# Patient Record
Sex: MALE | Race: WHITE | NOT HISPANIC OR LATINO | URBAN - METROPOLITAN AREA
[De-identification: names, ages, dates, MRNs, and addresses within clinical notes are randomized per-mention and may not be internally consistent; named-entity substitution may affect disease eponyms.]

---

## 2018-12-18 ENCOUNTER — EMERGENCY (EMERGENCY)
Facility: HOSPITAL | Age: 43
LOS: 1 days | Discharge: HOME | End: 2018-12-18
Attending: EMERGENCY MEDICINE | Admitting: EMERGENCY MEDICINE
Payer: COMMERCIAL

## 2018-12-18 VITALS
OXYGEN SATURATION: 100 % | HEIGHT: 66 IN | RESPIRATION RATE: 17 BRPM | TEMPERATURE: 98 F | HEART RATE: 114 BPM | DIASTOLIC BLOOD PRESSURE: 77 MMHG | SYSTOLIC BLOOD PRESSURE: 122 MMHG | WEIGHT: 190.04 LBS

## 2018-12-18 DIAGNOSIS — Z86.79 PERSONAL HISTORY OF OTHER DISEASES OF THE CIRCULATORY SYSTEM: Chronic | ICD-10-CM

## 2018-12-18 LAB
ALBUMIN SERPL ELPH-MCNC: 4.1 G/DL — SIGNIFICANT CHANGE UP (ref 3.3–5)
ALP SERPL-CCNC: 53 U/L — SIGNIFICANT CHANGE UP (ref 40–120)
ALT FLD-CCNC: 37 U/L — SIGNIFICANT CHANGE UP (ref 12–78)
ANION GAP SERPL CALC-SCNC: 10 MMOL/L — SIGNIFICANT CHANGE UP (ref 5–17)
APTT BLD: 27 SEC — LOW (ref 28.5–37)
AST SERPL-CCNC: 20 U/L — SIGNIFICANT CHANGE UP (ref 15–37)
BILIRUB SERPL-MCNC: 0.6 MG/DL — SIGNIFICANT CHANGE UP (ref 0.2–1.2)
BUN SERPL-MCNC: 23 MG/DL — SIGNIFICANT CHANGE UP (ref 7–23)
CALCIUM SERPL-MCNC: 8.7 MG/DL — SIGNIFICANT CHANGE UP (ref 8.5–10.1)
CHLORIDE SERPL-SCNC: 107 MMOL/L — SIGNIFICANT CHANGE UP (ref 96–108)
CK MB BLD-MCNC: <0.8 % — SIGNIFICANT CHANGE UP (ref 0–3.5)
CK MB CFR SERPL CALC: <1 NG/ML — SIGNIFICANT CHANGE UP (ref 0–3.6)
CK SERPL-CCNC: 121 U/L — SIGNIFICANT CHANGE UP (ref 26–308)
CO2 SERPL-SCNC: 23 MMOL/L — SIGNIFICANT CHANGE UP (ref 22–31)
CREAT SERPL-MCNC: 1.4 MG/DL — HIGH (ref 0.5–1.3)
GLUCOSE SERPL-MCNC: 95 MG/DL — SIGNIFICANT CHANGE UP (ref 70–99)
HCT VFR BLD CALC: 42.7 % — SIGNIFICANT CHANGE UP (ref 39–50)
HGB BLD-MCNC: 14.6 G/DL — SIGNIFICANT CHANGE UP (ref 13–17)
INR BLD: 0.97 RATIO — SIGNIFICANT CHANGE UP (ref 0.88–1.16)
MCHC RBC-ENTMCNC: 29.6 PG — SIGNIFICANT CHANGE UP (ref 27–34)
MCHC RBC-ENTMCNC: 34.2 GM/DL — SIGNIFICANT CHANGE UP (ref 32–36)
MCV RBC AUTO: 86.4 FL — SIGNIFICANT CHANGE UP (ref 80–100)
NRBC # BLD: 0 /100 WBCS — SIGNIFICANT CHANGE UP (ref 0–0)
PLATELET # BLD AUTO: 251 K/UL — SIGNIFICANT CHANGE UP (ref 150–400)
POTASSIUM SERPL-MCNC: 3.7 MMOL/L — SIGNIFICANT CHANGE UP (ref 3.5–5.3)
POTASSIUM SERPL-SCNC: 3.7 MMOL/L — SIGNIFICANT CHANGE UP (ref 3.5–5.3)
PROT SERPL-MCNC: 7.3 G/DL — SIGNIFICANT CHANGE UP (ref 6–8.3)
PROTHROM AB SERPL-ACNC: 11 SEC — SIGNIFICANT CHANGE UP (ref 10–12.9)
RBC # BLD: 4.94 M/UL — SIGNIFICANT CHANGE UP (ref 4.2–5.8)
RBC # FLD: 12.5 % — SIGNIFICANT CHANGE UP (ref 10.3–14.5)
SODIUM SERPL-SCNC: 140 MMOL/L — SIGNIFICANT CHANGE UP (ref 135–145)
TROPONIN I SERPL-MCNC: <.015 NG/ML — SIGNIFICANT CHANGE UP (ref 0.01–0.04)
WBC # BLD: 3.97 K/UL — SIGNIFICANT CHANGE UP (ref 3.8–10.5)
WBC # FLD AUTO: 3.97 K/UL — SIGNIFICANT CHANGE UP (ref 3.8–10.5)

## 2018-12-18 PROCEDURE — 71045 X-RAY EXAM CHEST 1 VIEW: CPT | Mod: 26

## 2018-12-18 PROCEDURE — 99285 EMERGENCY DEPT VISIT HI MDM: CPT

## 2018-12-18 RX ORDER — ACETAMINOPHEN 500 MG
650 TABLET ORAL ONCE
Qty: 0 | Refills: 0 | Status: COMPLETED | OUTPATIENT
Start: 2018-12-18 | End: 2018-12-18

## 2018-12-18 RX ADMIN — Medication 650 MILLIGRAM(S): at 22:18

## 2018-12-18 NOTE — CONSULT NOTE ADULT - SUBJECTIVE AND OBJECTIVE BOX
History of Present Illness:    Past Medical/Surgical History:    Medications:    Family History: Non-contributory family history of premature cardiovascular atherosclerotic disease    Social History: No tobacco, alcohol or drug use    Review of Systems:  General: No fevers, chills, weight loss or gain  Skin: No rashes, color changes  Cardiovascular: No chest pain, orthopnea  Respiratory: No shortness of breath, cough  Gastrointestinal: No nausea, abdominal pain  Genitourinary: No incontinence, pain with urination  Musculoskeletal: No pain, swelling, decreased range of motion  Neurological: No headache, weakness  Psychiatric: No depression, anxiety  Endocrine: No weight loss or gain, increased thirst  All other systems are comprehensively negative.    Physical Exam:  Vitals:        Vital Signs Last 24 Hrs  T(C): 36.6 (18 Dec 2018 18:19), Max: 36.6 (18 Dec 2018 18:19)  T(F): 97.8 (18 Dec 2018 18:19), Max: 97.8 (18 Dec 2018 18:19)  HR: 114 (18 Dec 2018 18:19) (114 - 114)  BP: 122/77 (18 Dec 2018 18:19) (122/77 - 122/77)  BP(mean): --  RR: 17 (18 Dec 2018 18:19) (17 - 17)  SpO2: 100% (18 Dec 2018 18:19) (100% - 100%)  General: NAD  HEENT: MMM  Neck: No JVD, no carotid bruit  Lungs: CTAB  CV: RRR, nl S1/S2, no M/R/G  Abdomen: S/NT/ND, +BS  Extremities: No LE edema, no cyanosis  Neuro: AAOx3, non-focal  Skin: No rash    Labs:                        14.6   3.97  )-----------( 251      ( 18 Dec 2018 19:49 )             42.7                   ECG: NSR, normal axis, LVH with secondary repol abnormality History of Present Illness: The patient is a 43 year old male with a history of HTN, anxiety/depression who presents with chest pain. He was driving when he had sudden onset substernal chest pressure. There was radiation of pain and numbness down both arms. There was associated shortness of breath, dizziness, palpitations, blurriness of his vision, and headache. He had similar symptoms 1 week ago. He went to [facility] where he was ruled out for MI with two sets of cardiac enzymes. He has had similar episodes in the past that were attributed to panic attacks. He had a stress test about 2 years ago after one of these episodes and it was normal.    Past Medical/Surgical History:  HTN, anxiety/depression     Medications:  Home Medications:  hydrALAZINE 25 mg oral tablet:  (18 Dec 2018 18:23)  losartan 100 mg oral tablet: 1 tab(s) orally once a day (18 Dec 2018 18:23)  Zoloft:  (18 Dec 2018 18:23)      Family History: Non-contributory family history of premature cardiovascular atherosclerotic disease    Social History: No tobacco, alcohol or drug use    Review of Systems:  General: No fevers, chills, weight loss or gain  Skin: No rashes, color changes  Cardiovascular: (+) chest pain, orthopnea  Respiratory: (+) shortness of breath, cough  Gastrointestinal: No nausea, abdominal pain  Genitourinary: No incontinence, pain with urination  Musculoskeletal: No pain, swelling, decreased range of motion  Neurological: (+) headache, weakness  Psychiatric: No depression, anxiety  Endocrine: No weight loss or gain, increased thirst  All other systems are comprehensively negative.    Physical Exam:  Vitals:        Vital Signs Last 24 Hrs  T(C): 36.6 (18 Dec 2018 18:19), Max: 36.6 (18 Dec 2018 18:19)  T(F): 97.8 (18 Dec 2018 18:19), Max: 97.8 (18 Dec 2018 18:19)  HR: 114 (18 Dec 2018 18:19) (114 - 114)  BP: 122/77 (18 Dec 2018 18:19) (122/77 - 122/77)  BP(mean): --  RR: 17 (18 Dec 2018 18:19) (17 - 17)  SpO2: 100% (18 Dec 2018 18:19) (100% - 100%)  General: NAD  HEENT: MMM  Neck: No JVD, no carotid bruit  Lungs: CTAB  CV: RRR, nl S1/S2, no M/R/G  Abdomen: S/NT/ND, +BS  Extremities: No LE edema, no cyanosis  Neuro: AAOx3, non-focal  Skin: No rash    Labs:                        14.6   3.97  )-----------( 251      ( 18 Dec 2018 19:49 )             42.7                   ECG: NSR, normal axis, LVH with secondary repol abnormality

## 2018-12-18 NOTE — CONSULT NOTE ADULT - ASSESSMENT
The patient is a 43 year old male with a history of HTN, anxiety/depression who presents with chest pain.    Plan:  - Symptoms likely due to panic attack  - ECG with LVH but no evidence for ischemia or infarction  - Rule out acute MI with two sets of cardiac enzymes  - Check CXR  - If above work-up negative, patient can be discharged and follow-up for stress test and echo

## 2018-12-18 NOTE — ED ADULT NURSE NOTE - OBJECTIVE STATEMENT
Received to the ED a&ox4, with reports of chest pain and elevated anxiety. SOB present. Deep breathing exercises  performed by the patient and comfort measures provided to help decrease anxiety.

## 2018-12-18 NOTE — ED PROVIDER NOTE - OBJECTIVE STATEMENT
pt c/o 1 hr of mid chest pressure with sob and mild blurred vision. no fevers, chills, ha, cough, abd pain, edema, calf pain, travel. pt had similar episode last week, seen at [facility] er, had labs and ct head, was d/c didn't see a cardiologist yet.  pmd - forgot name

## 2018-12-18 NOTE — ED PROVIDER NOTE - PROGRESS NOTE DETAILS
Dewey (cards) seen eval pt, requests 2nd ce at 0100 and d/c if neg for outpt f/u Reevaluated patient at bedside.  Patient feeling much improved, wants to be d/c home to f/u as outpt.  Discussed the results of all diagnostic testing in ED and copies of all reports given.   An opportunity to ask questions was given.  Discussed the importance of prompt, close medical follow-up.  Patient will return with any changes, concerns or persistent / worsening symptoms.  Understanding of all instructions verbalized.

## 2018-12-19 VITALS — DIASTOLIC BLOOD PRESSURE: 55 MMHG | SYSTOLIC BLOOD PRESSURE: 113 MMHG

## 2018-12-19 LAB — TROPONIN I SERPL-MCNC: <.015 NG/ML — SIGNIFICANT CHANGE UP (ref 0.01–0.04)

## 2018-12-19 PROCEDURE — 85027 COMPLETE CBC AUTOMATED: CPT

## 2018-12-19 PROCEDURE — 93005 ELECTROCARDIOGRAM TRACING: CPT

## 2018-12-19 PROCEDURE — 84484 ASSAY OF TROPONIN QUANT: CPT

## 2018-12-19 PROCEDURE — 85610 PROTHROMBIN TIME: CPT

## 2018-12-19 PROCEDURE — 80053 COMPREHEN METABOLIC PANEL: CPT

## 2018-12-19 PROCEDURE — 71045 X-RAY EXAM CHEST 1 VIEW: CPT

## 2018-12-19 PROCEDURE — 82553 CREATINE MB FRACTION: CPT

## 2018-12-19 PROCEDURE — 82550 ASSAY OF CK (CPK): CPT

## 2018-12-19 PROCEDURE — 36415 COLL VENOUS BLD VENIPUNCTURE: CPT

## 2018-12-19 PROCEDURE — 85730 THROMBOPLASTIN TIME PARTIAL: CPT

## 2018-12-19 PROCEDURE — 99284 EMERGENCY DEPT VISIT MOD MDM: CPT | Mod: 25

## 2018-12-19 NOTE — ED ADULT NURSE REASSESSMENT NOTE - NS ED NURSE REASSESS COMMENT FT1
MD Yin made aware of change in v/s. Awaiting further orderers. The patient is in bed a&ox4, denies feeling any changes, currently on cardiac monitor.

## 2019-01-11 ENCOUNTER — EMERGENCY (EMERGENCY)
Facility: HOSPITAL | Age: 44
LOS: 1 days | Discharge: OTHER INSTITUTION | End: 2019-01-11
Attending: EMERGENCY MEDICINE
Payer: MEDICAID

## 2019-01-11 VITALS
SYSTOLIC BLOOD PRESSURE: 147 MMHG | OXYGEN SATURATION: 98 % | WEIGHT: 197.09 LBS | HEIGHT: 66 IN | TEMPERATURE: 98 F | HEART RATE: 112 BPM | DIASTOLIC BLOOD PRESSURE: 96 MMHG | RESPIRATION RATE: 22 BRPM

## 2019-01-11 DIAGNOSIS — F13.10 SEDATIVE, HYPNOTIC OR ANXIOLYTIC ABUSE, UNCOMPLICATED: ICD-10-CM

## 2019-01-11 DIAGNOSIS — F33.2 MAJOR DEPRESSIVE DISORDER, RECURRENT SEVERE WITHOUT PSYCHOTIC FEATURES: ICD-10-CM

## 2019-01-11 DIAGNOSIS — Z86.79 PERSONAL HISTORY OF OTHER DISEASES OF THE CIRCULATORY SYSTEM: Chronic | ICD-10-CM

## 2019-01-11 LAB
ALBUMIN SERPL ELPH-MCNC: 4.5 G/DL — SIGNIFICANT CHANGE UP (ref 3.3–5.2)
ALP SERPL-CCNC: 58 U/L — SIGNIFICANT CHANGE UP (ref 40–120)
ALT FLD-CCNC: 37 U/L — SIGNIFICANT CHANGE UP
ANION GAP SERPL CALC-SCNC: 15 MMOL/L — SIGNIFICANT CHANGE UP (ref 5–17)
APAP SERPL-MCNC: <7.5 UG/ML — LOW (ref 10–26)
AST SERPL-CCNC: 20 U/L — SIGNIFICANT CHANGE UP
BASOPHILS # BLD AUTO: 0 K/UL — SIGNIFICANT CHANGE UP (ref 0–0.2)
BASOPHILS NFR BLD AUTO: 0.2 % — SIGNIFICANT CHANGE UP (ref 0–2)
BILIRUB SERPL-MCNC: 0.6 MG/DL — SIGNIFICANT CHANGE UP (ref 0.4–2)
BUN SERPL-MCNC: 13 MG/DL — SIGNIFICANT CHANGE UP (ref 8–20)
CALCIUM SERPL-MCNC: 9.5 MG/DL — SIGNIFICANT CHANGE UP (ref 8.6–10.2)
CHLORIDE SERPL-SCNC: 102 MMOL/L — SIGNIFICANT CHANGE UP (ref 98–107)
CO2 SERPL-SCNC: 24 MMOL/L — SIGNIFICANT CHANGE UP (ref 22–29)
CREAT SERPL-MCNC: 0.8 MG/DL — SIGNIFICANT CHANGE UP (ref 0.5–1.3)
EOSINOPHIL # BLD AUTO: 0 K/UL — SIGNIFICANT CHANGE UP (ref 0–0.5)
EOSINOPHIL NFR BLD AUTO: 0.4 % — SIGNIFICANT CHANGE UP (ref 0–5)
ETHANOL SERPL-MCNC: <10 MG/DL — SIGNIFICANT CHANGE UP
GLUCOSE SERPL-MCNC: 102 MG/DL — SIGNIFICANT CHANGE UP (ref 70–115)
HCT VFR BLD CALC: 43.4 % — SIGNIFICANT CHANGE UP (ref 42–52)
HGB BLD-MCNC: 15.5 G/DL — SIGNIFICANT CHANGE UP (ref 14–18)
LYMPHOCYTES # BLD AUTO: 1.1 K/UL — SIGNIFICANT CHANGE UP (ref 1–4.8)
LYMPHOCYTES # BLD AUTO: 19.5 % — LOW (ref 20–55)
MCHC RBC-ENTMCNC: 30.6 PG — SIGNIFICANT CHANGE UP (ref 27–31)
MCHC RBC-ENTMCNC: 35.7 G/DL — SIGNIFICANT CHANGE UP (ref 32–36)
MCV RBC AUTO: 85.8 FL — SIGNIFICANT CHANGE UP (ref 80–94)
MONOCYTES # BLD AUTO: 0.5 K/UL — SIGNIFICANT CHANGE UP (ref 0–0.8)
MONOCYTES NFR BLD AUTO: 9.4 % — SIGNIFICANT CHANGE UP (ref 3–10)
NEUTROPHILS # BLD AUTO: 3.8 K/UL — SIGNIFICANT CHANGE UP (ref 1.8–8)
NEUTROPHILS NFR BLD AUTO: 70.3 % — SIGNIFICANT CHANGE UP (ref 37–73)
NT-PROBNP SERPL-SCNC: 23 PG/ML — SIGNIFICANT CHANGE UP (ref 0–300)
PLATELET # BLD AUTO: 215 K/UL — SIGNIFICANT CHANGE UP (ref 150–400)
POTASSIUM SERPL-MCNC: 3.2 MMOL/L — LOW (ref 3.5–5.3)
POTASSIUM SERPL-SCNC: 3.2 MMOL/L — LOW (ref 3.5–5.3)
PROT SERPL-MCNC: 7.4 G/DL — SIGNIFICANT CHANGE UP (ref 6.6–8.7)
RBC # BLD: 5.06 M/UL — SIGNIFICANT CHANGE UP (ref 4.6–6.2)
RBC # FLD: 13 % — SIGNIFICANT CHANGE UP (ref 11–15.6)
SALICYLATES SERPL-MCNC: <0.6 MG/DL — LOW (ref 10–20)
SODIUM SERPL-SCNC: 141 MMOL/L — SIGNIFICANT CHANGE UP (ref 135–145)
WBC # BLD: 5.4 K/UL — SIGNIFICANT CHANGE UP (ref 4.8–10.8)
WBC # FLD AUTO: 5.4 K/UL — SIGNIFICANT CHANGE UP (ref 4.8–10.8)

## 2019-01-11 PROCEDURE — 71045 X-RAY EXAM CHEST 1 VIEW: CPT | Mod: 26

## 2019-01-11 PROCEDURE — 99285 EMERGENCY DEPT VISIT HI MDM: CPT

## 2019-01-11 PROCEDURE — 93010 ELECTROCARDIOGRAM REPORT: CPT

## 2019-01-11 RX ORDER — LOSARTAN POTASSIUM 100 MG/1
100 TABLET, FILM COATED ORAL ONCE
Qty: 0 | Refills: 0 | Status: COMPLETED | OUTPATIENT
Start: 2019-01-11 | End: 2019-01-11

## 2019-01-11 RX ORDER — POTASSIUM CHLORIDE 20 MEQ
40 PACKET (EA) ORAL ONCE
Qty: 0 | Refills: 0 | Status: COMPLETED | OUTPATIENT
Start: 2019-01-11 | End: 2019-01-11

## 2019-01-11 NOTE — ED BEHAVIORAL HEALTH ASSESSMENT NOTE - SUMMARY
43M, , domiciled with mother, works as , hx of depression, hx of suicide attempt via OD, hx of etoh abuse and rehab in 1999, last hopsitalization at [facility] December 2017 for SI s/p breakup with girlfriend, with pmhx HTN presents BIBA from outpatient [facility] psych for medical clearance after he tried to check himself in following OD on pills in context of financial, work and social stressors (from ex-wife regarding money and custody over children).   Requesting inpatient psych and meets criteria

## 2019-01-11 NOTE — ED BEHAVIORAL HEALTH ASSESSMENT NOTE - SUICIDE RISK FACTORS
Substance abuse/dependence/Unable to engage in safety planning/Hopelessness/Mood episode/Access to means (pills, firearms, etc.)

## 2019-01-11 NOTE — ED ADULT TRIAGE NOTE - CHIEF COMPLAINT QUOTE
Patient presents to ER for medical evaluation, patient reports taking 10 xanax pills this AM, reports HX of depression, patient picked up at [facility], he was there in attempt to get admitted, patient states he was trying to put a stop to his pain, reports weakness and dizziness.

## 2019-01-11 NOTE — ED BEHAVIORAL HEALTH ASSESSMENT NOTE - OTHER PAST PSYCHIATRIC HISTORY (INCLUDE DETAILS REGARDING ONSET, COURSE OF ILLNESS, INPATIENT/OUTPATIENT TREATMENT)
hx of depression, hx of suicide attempt via OD, hx of etoh abuse and rehab in 1999, last hopsitalization at [facility] December 2017 for SI s/p breakup with girlfriend

## 2019-01-11 NOTE — ED PROVIDER NOTE - NS ED ROS FT
Const: Denies fever, chills  HEENT: Denies blurry vision, sore throat  Neck: Denies neck pain/stiffness  Resp: + SOB. Denies coughing  Cardiovascular: + CP, + palpitations. Denies LE edema  GI: Denies nausea, vomiting, abdominal pain, diarrhea, constipation, blood in stool  : Denies urinary frequency/urgency/dysuria, hematuria  MSK: Denies back pain  Neuro: Denies HA, dizziness, numbness, weakness  Skin: Denies rashes.   Psych: + SI, Denies HI, AVH

## 2019-01-11 NOTE — ED PROVIDER NOTE - PROGRESS NOTE DETAILS
I discussed with  who will send patient to evaluate the patient, pending medical clearance. recd sign out  patient seen and evaluated, needs admission, awaits bed

## 2019-01-11 NOTE — ED ADULT TRIAGE NOTE - ESI TRIAGE ACUITY LEVEL, MLM
End Of Shift Functional Summary, Nursing      TOILETING:  Does patient need assist with clothing management and/or pericare? No    TOILET TRANSFER:  Pt requires minimal assistance. Pt uses walker. BLADDER:  Pt does not have a plunkett catheter that staff manages. Pt does take medication. Pt is continent. of bladder and voids in toilet  Pt has had 0 bladder accidents during this shift.  (An accident is when the episode is not contained in a brief AND/OR the clothing/linen requires changing/cleaning up.)    BOWEL:  Pt does take medication. Pt is continent of bowel and uses toilet. Pt has had 0 bowel accidents during this shift. (An accident is when the episode is not contained in a brief AND/OR the clothing/linen requires changing/cleaning up.)    BED/CHAIR TRANSFER  Pt requires minimal assistance. Patient requires the assistance of 1 staff member(s). Pt uses walker    EATING  Pt requires no assistance. Pt does not wear dentures. TUBE FEEDINGS:  Pt does not  receive nutrition through tube feedings. Patient requires no assistance with feedings. Documentation reviewed and plan of care discussed/reviewed with   oncoming nurse during the shift. 2

## 2019-01-11 NOTE — ED PROVIDER NOTE - MEDICAL DECISION MAKING DETAILS
Patient presents from outpatient [facility] for medical clearance after an attempt which started yesterday of 10 pills of 10 mg xanax yesterday at 11 pm, then another 3 pills at 11 am today as well as 6 pills of 25 mg prozac 11 pm yesterday and another 3 pills of 25 mg prozac at 11 am today. He is mildly hypertensive, mildly  tachycardic, appears anxious, but awake, alert, and appropriate. Will medically clear and have  evaluate him.

## 2019-01-11 NOTE — ED PROVIDER NOTE - OBJECTIVE STATEMENT
Patient with PMH HTN, depression, anxiety presents BIBA from outpatient [facility] psych for medical clearance after he tried to check himself in following suicide attempt. He states due to financial and social stressors (from ex-wife regarding money and custody over children) he took 10 pills of 10 mg xanax and 6 pills of 25 mg prozac yesterday around 11 pm as he felt increased anxiety which he describes as chest pain, palpitations, SOB and anxiety. This morning around 11 am he took another 3 pills of 10 mg xanax and another 3 pills of 25 mg prozac prior to trying to check himself in to [facility]. His last attempt was around this time last year when he was admitted to [facility] following an attempt of which he cannot recall the details. He denies concomitant drugs/EtOH use. He states that he has been out of his losartan 100 mg medication and therefore his BP has been more elevated than usual. He states he otherwise has been at his health.  PMD: Osvaldo Lugo

## 2019-01-11 NOTE — ED PROVIDER NOTE - PHYSICAL EXAMINATION
Const: Awake, alert and oriented. In no acute distress. Well appearing.  HEENT: NC/AT. Moist mucous membranes.  Eyes: No scleral icterus. EOMI.  Neck:. Soft and supple. Full ROM without pain.  Cardiac: Tachycardic rate and regular rhythm. +S1/S2. No murmurs. Peripheral pulses 2+ and symmetric. No LE edema.  Resp: Speaking in full sentences. No evidence of respiratory distress. No wheezes, rales or rhonchi.  Abd: Soft, non-tender, non-distended. Normal bowel sounds in all 4 quadrants. No guarding or rebound.  Back: Spine midline and non-tender. No CVAT.  Skin: No rashes, abrasions or lacerations.  Neuro: Awake, alert & oriented x 3. Moves all extremities symmetrically.   Psych: Depressed mood, good eye contact, appropriate speech and thought process.

## 2019-01-11 NOTE — ED BEHAVIORAL HEALTH ASSESSMENT NOTE - KNOWN PSYCHIATRIC ADMISSION WITHIN THE PAST 30 DAYS
No

## 2019-01-11 NOTE — ED BEHAVIORAL HEALTH ASSESSMENT NOTE - HPI (INCLUDE ILLNESS QUALITY, SEVERITY, DURATION, TIMING, CONTEXT, MODIFYING FACTORS, ASSOCIATED SIGNS AND SYMPTOMS)
43M, , domiciled with mother, works as , hx of depression, hx of suicide attempt via OD, hx of etoh abuse and rehab in 1999, last hopsitalization at [facility] December 2017 for SI s/p breakup with girlfriend, with pmhx HTN presents BIBA from outpatient [facility] psych for medical clearance after he tried to check himself in following OD on pills.   Pt states he has financial, work and social stressors (from ex-wife regarding money and custody over children). He has been particularly anxious causing significant sleep issues and persistent discomfort. He admits to declining mood over the holidays due to feeling overwhelmed but cannot identify an acute stressor. He took 10 pills of 1mg xanax and 6 pills of 25 mg zoloft yesterday around 11 pm as he felt increased anxiety which he describes as chest pain, palpitations, SOB and anxiety. This morning around 11 am he took another 3 pills of 1mg xanax and another 3 pills of 25 mg zoloft, prior to arriving at [facility]. He denies concomitant drugs/EtOH use - last drink 3 days ago. He endorses anhedonia, hopelessness, withdrawing and isolating over recent weeks. He denies hi/avh; no delusions or paranoia elicited.    Attempted to get collateral from mother Juanis but unavailable - [phone]

## 2019-01-11 NOTE — ED BEHAVIORAL HEALTH NOTE - BEHAVIORAL HEALTH NOTE
SWNote: pt seen by psych NP(Miguelina) found to benefit from inpatient hospitalization. No adult bed available at this moment. Pt not medically clear as yet. SW to follow in the am.

## 2019-01-12 ENCOUNTER — INPATIENT (INPATIENT)
Facility: HOSPITAL | Age: 44
LOS: 36 days | Discharge: HOME | DRG: 885 | End: 2019-02-18
Attending: PSYCHIATRY & NEUROLOGY | Admitting: PSYCHIATRY & NEUROLOGY
Payer: MEDICAID

## 2019-01-12 VITALS
DIASTOLIC BLOOD PRESSURE: 77 MMHG | RESPIRATION RATE: 20 BRPM | SYSTOLIC BLOOD PRESSURE: 125 MMHG | TEMPERATURE: 98 F | OXYGEN SATURATION: 96 % | HEART RATE: 96 BPM

## 2019-01-12 DIAGNOSIS — F33.2 MAJOR DEPRESSIVE DISORDER, RECURRENT SEVERE WITHOUT PSYCHOTIC FEATURES: ICD-10-CM

## 2019-01-12 DIAGNOSIS — Z86.79 PERSONAL HISTORY OF OTHER DISEASES OF THE CIRCULATORY SYSTEM: Chronic | ICD-10-CM

## 2019-01-12 PROBLEM — F41.9 ANXIETY DISORDER, UNSPECIFIED: Chronic | Status: ACTIVE | Noted: 2018-12-18

## 2019-01-12 PROBLEM — I10 ESSENTIAL (PRIMARY) HYPERTENSION: Chronic | Status: ACTIVE | Noted: 2018-12-18

## 2019-01-12 PROBLEM — F32.9 MAJOR DEPRESSIVE DISORDER, SINGLE EPISODE, UNSPECIFIED: Chronic | Status: ACTIVE | Noted: 2018-12-18

## 2019-01-12 LAB
AMPHET UR-MCNC: NEGATIVE — SIGNIFICANT CHANGE UP
APPEARANCE UR: CLEAR — SIGNIFICANT CHANGE UP
BARBITURATES UR SCN-MCNC: NEGATIVE — SIGNIFICANT CHANGE UP
BENZODIAZ UR-MCNC: NEGATIVE — SIGNIFICANT CHANGE UP
BILIRUB UR-MCNC: NEGATIVE — SIGNIFICANT CHANGE UP
COCAINE METAB.OTHER UR-MCNC: NEGATIVE — SIGNIFICANT CHANGE UP
COLOR SPEC: YELLOW — SIGNIFICANT CHANGE UP
DIFF PNL FLD: NEGATIVE — SIGNIFICANT CHANGE UP
EPI CELLS # UR: SIGNIFICANT CHANGE UP
GLUCOSE UR QL: NEGATIVE MG/DL — SIGNIFICANT CHANGE UP
KETONES UR-MCNC: ABNORMAL
LEUKOCYTE ESTERASE UR-ACNC: ABNORMAL
METHADONE UR-MCNC: NEGATIVE — SIGNIFICANT CHANGE UP
NITRITE UR-MCNC: NEGATIVE — SIGNIFICANT CHANGE UP
OPIATES UR-MCNC: NEGATIVE — SIGNIFICANT CHANGE UP
PCP SPEC-MCNC: SIGNIFICANT CHANGE UP
PCP UR-MCNC: NEGATIVE — SIGNIFICANT CHANGE UP
PH UR: 6 — SIGNIFICANT CHANGE UP (ref 5–8)
PROT UR-MCNC: 15 MG/DL
RBC CASTS # UR COMP ASSIST: NEGATIVE /HPF — SIGNIFICANT CHANGE UP (ref 0–4)
SP GR SPEC: 1.02 — SIGNIFICANT CHANGE UP (ref 1.01–1.02)
THC UR QL: NEGATIVE — SIGNIFICANT CHANGE UP
TROPONIN T SERPL-MCNC: <0.01 NG/ML — SIGNIFICANT CHANGE UP (ref 0–0.06)
UROBILINOGEN FLD QL: NEGATIVE MG/DL — SIGNIFICANT CHANGE UP
WBC UR QL: SIGNIFICANT CHANGE UP

## 2019-01-12 PROCEDURE — 93005 ELECTROCARDIOGRAM TRACING: CPT

## 2019-01-12 PROCEDURE — 36415 COLL VENOUS BLD VENIPUNCTURE: CPT

## 2019-01-12 PROCEDURE — 81001 URINALYSIS AUTO W/SCOPE: CPT

## 2019-01-12 PROCEDURE — 99285 EMERGENCY DEPT VISIT HI MDM: CPT | Mod: 25

## 2019-01-12 PROCEDURE — 83880 ASSAY OF NATRIURETIC PEPTIDE: CPT

## 2019-01-12 PROCEDURE — 85027 COMPLETE CBC AUTOMATED: CPT

## 2019-01-12 PROCEDURE — 99213 OFFICE O/P EST LOW 20 MIN: CPT

## 2019-01-12 PROCEDURE — 80053 COMPREHEN METABOLIC PANEL: CPT

## 2019-01-12 PROCEDURE — 84484 ASSAY OF TROPONIN QUANT: CPT

## 2019-01-12 PROCEDURE — 80307 DRUG TEST PRSMV CHEM ANLYZR: CPT

## 2019-01-12 PROCEDURE — 71045 X-RAY EXAM CHEST 1 VIEW: CPT

## 2019-01-12 RX ORDER — ACETAMINOPHEN 500 MG
650 TABLET ORAL EVERY 6 HOURS
Qty: 0 | Refills: 0 | Status: DISCONTINUED | OUTPATIENT
Start: 2019-01-12 | End: 2019-02-18

## 2019-01-12 RX ORDER — ASPIRIN/CALCIUM CARB/MAGNESIUM 324 MG
81 TABLET ORAL DAILY
Qty: 0 | Refills: 0 | Status: DISCONTINUED | OUTPATIENT
Start: 2019-01-12 | End: 2019-01-16

## 2019-01-12 RX ORDER — ASPIRIN/CALCIUM CARB/MAGNESIUM 324 MG
81 TABLET ORAL DAILY
Qty: 0 | Refills: 0 | Status: DISCONTINUED | OUTPATIENT
Start: 2019-01-12 | End: 2019-02-18

## 2019-01-12 RX ORDER — HALOPERIDOL DECANOATE 100 MG/ML
5 INJECTION INTRAMUSCULAR EVERY 6 HOURS
Qty: 0 | Refills: 0 | Status: DISCONTINUED | OUTPATIENT
Start: 2019-01-12 | End: 2019-01-14

## 2019-01-12 RX ORDER — LOSARTAN POTASSIUM 100 MG/1
100 TABLET, FILM COATED ORAL DAILY
Qty: 0 | Refills: 0 | Status: DISCONTINUED | OUTPATIENT
Start: 2019-01-12 | End: 2019-02-11

## 2019-01-12 RX ORDER — ATORVASTATIN CALCIUM 80 MG/1
20 TABLET, FILM COATED ORAL AT BEDTIME
Qty: 0 | Refills: 0 | Status: DISCONTINUED | OUTPATIENT
Start: 2019-01-12 | End: 2019-02-18

## 2019-01-12 RX ORDER — ACETAMINOPHEN 500 MG
650 TABLET ORAL ONCE
Qty: 0 | Refills: 0 | Status: COMPLETED | OUTPATIENT
Start: 2019-01-12 | End: 2019-01-12

## 2019-01-12 RX ORDER — QUETIAPINE FUMARATE 200 MG/1
50 TABLET, FILM COATED ORAL EVERY 6 HOURS
Qty: 0 | Refills: 0 | Status: DISCONTINUED | OUTPATIENT
Start: 2019-01-12 | End: 2019-02-18

## 2019-01-12 RX ORDER — HYDRALAZINE HCL 50 MG
25 TABLET ORAL THREE TIMES A DAY
Qty: 0 | Refills: 0 | Status: DISCONTINUED | OUTPATIENT
Start: 2019-01-12 | End: 2019-01-24

## 2019-01-12 RX ORDER — NICOTINE POLACRILEX 2 MG
1 GUM BUCCAL
Qty: 0 | Refills: 0 | Status: DISCONTINUED | OUTPATIENT
Start: 2019-01-12 | End: 2019-02-18

## 2019-01-12 RX ORDER — HYDRALAZINE HCL 50 MG
25 TABLET ORAL THREE TIMES A DAY
Qty: 0 | Refills: 0 | Status: DISCONTINUED | OUTPATIENT
Start: 2019-01-12 | End: 2019-01-16

## 2019-01-12 RX ORDER — ATORVASTATIN CALCIUM 80 MG/1
20 TABLET, FILM COATED ORAL AT BEDTIME
Qty: 0 | Refills: 0 | Status: DISCONTINUED | OUTPATIENT
Start: 2019-01-12 | End: 2019-01-16

## 2019-01-12 RX ORDER — DIPHENHYDRAMINE HCL 50 MG
25 CAPSULE ORAL EVERY 6 HOURS
Qty: 0 | Refills: 0 | Status: DISCONTINUED | OUTPATIENT
Start: 2019-01-12 | End: 2019-01-14

## 2019-01-12 RX ORDER — TRAZODONE HCL 50 MG
100 TABLET ORAL AT BEDTIME
Qty: 0 | Refills: 0 | Status: DISCONTINUED | OUTPATIENT
Start: 2019-01-12 | End: 2019-01-16

## 2019-01-12 RX ORDER — LOSARTAN POTASSIUM 100 MG/1
100 TABLET, FILM COATED ORAL DAILY
Qty: 0 | Refills: 0 | Status: DISCONTINUED | OUTPATIENT
Start: 2019-01-12 | End: 2019-01-16

## 2019-01-12 RX ADMIN — LOSARTAN POTASSIUM 100 MILLIGRAM(S): 100 TABLET, FILM COATED ORAL at 01:47

## 2019-01-12 RX ADMIN — Medication 650 MILLIGRAM(S): at 05:16

## 2019-01-12 RX ADMIN — Medication 25 MILLIGRAM(S): at 09:21

## 2019-01-12 RX ADMIN — ATORVASTATIN CALCIUM 20 MILLIGRAM(S): 80 TABLET, FILM COATED ORAL at 20:50

## 2019-01-12 RX ADMIN — Medication 650 MILLIGRAM(S): at 08:26

## 2019-01-12 RX ADMIN — Medication 81 MILLIGRAM(S): at 09:21

## 2019-01-12 RX ADMIN — Medication 25 MILLIGRAM(S): at 20:50

## 2019-01-12 RX ADMIN — Medication 40 MILLIEQUIVALENT(S): at 01:14

## 2019-01-12 NOTE — ED BEHAVIORAL HEALTH NOTE - BEHAVIORAL HEALTH NOTE
PROGRESS NOTE: 19 @ 08:42  	  • Reason for Ongoing Consultation: 	held for transfer to in psych bed    ID: [id number] Male with HEALTH ISSUES - PROBLEM Dx:  Benzodiazepine misuse  Severe episode of recurrent major depressive disorder, without psychotic features          INTERVAL DATA:   • Interval Chief Complaint: I feel depressed and suicidal  • Interval History:   slept cooperative with care reviewed medical history and current medications NO complaints agrees to voluntary admisssion Signed legal papers  REVIEW OF SYSTEMS:   • Constitutional Symptoms	No complaints  • Eyes	No complaints  • Ears / Nose / Throat / Mouth	No complaints  • Cardiovascular	No complaints  • Respiratory	No complaints  • Gastrointestinal	No complaints  • Genitourinary	No complaints  • Musculoskeletal	No complaints  • Skin	No complaints  • Neurological	No complaints  • Psychiatric (see HPI)	See HPI  • Endocrine	No complaints  • Hematologic / Lymphatic	No complaints  • Allergic / Immunologic	No complaints    REVIEW OF VITALS/LABS/IMAGING/INVESTIGATIONS:   • Vital signs reviewed: Yes  • Vital Signs:	    T(C): 36.6 (19 @ 07:28), Max: 36.8 (19 @ 03:34)  HR: 78 (19 @ 07:28) (78 - 112)  BP: 117/74 (19 @ 07:28) (117/74 - 147/96)  RR: 20 (19 @ 07:28) (18 - 22)  SpO2: 96% (19 @ 07:28) (94% - 99%)    • Available labs reviewed: Yes  • Available Lab Results:                           15.5   5.4   )-----------( 215      ( 2019 20:48 )             43.4         141  |  102  |  13.0  ----------------------------<  102  3.2<L>   |  24.0  |  0.80    Ca    9.5      2019 20:48    TPro  7.4  /  Alb  4.5  /  TBili  0.6  /  DBili  x   /  AST  20  /  ALT  37  /  AlkPhos  58      LIVER FUNCTIONS - ( 2019 20:48 )  Alb: 4.5 g/dL / Pro: 7.4 g/dL / ALK PHOS: 58 U/L / ALT: 37 U/L / AST: 20 U/L / GGT: x             Urinalysis Basic - ( 2019 01:43 )    Color: Yellow / Appearance: Clear / S.020 / pH: x  Gluc: x / Ketone: Trace  / Bili: Negative / Urobili: Negative mg/dL   Blood: x / Protein: 15 mg/dL / Nitrite: Negative   Leuk Esterase: Trace / RBC: Negative /HPF / WBC 0-2   Sq Epi: x / Non Sq Epi: Occasional / Bacteria: x          MEDICATIONS:      PRN Medications: NONE  • PRN Medications since last evaluation	  • PRN Details	    Current Medications:   aspirin enteric coated 81 milliGRAM(s) Oral daily  atorvastatin 20 milliGRAM(s) Oral at bedtime  hydrALAZINE 25 milliGRAM(s) Oral three times a day  losartan 100 milliGRAM(s) Oral daily     Medication Side Effects:  • Medication Side Effects or Adverse Reactions (new or ongoing)	None known    MENTAL STATUS EXAM:   • Level of Consciousness	Alert  • General Appearance	Well developed  • Body Habitus	Well nourished  • Hygiene	Good  • Grooming	Good  • Behavior	Cooperative  • Eye Contact	Good  • Relatedness	Good  • Impulse Control	Normal  • Muscle Tone / Strength	Normal muscle tone/strength  • Abnormal Movements	No abnormal movements  • Gait / Station	Normal gait / station  • Speech Volume	Normal  • Speech Rate	Normal  • Speech Spontaneity	Normal  • Speech Articulation	Normal  • Mood	depressed  • Affect Quality	sad  • Affect Range	Full  • Affect Congruence	Congruent  • Thought Process	Linear  • Thought Associations	Normal  • Thought Content	suicidal urges to overdose , hopelessness  • Perceptions	No abnormalities  • Oriented to Time	Yes  • Oriented to Place	Yes  • Oriented to Situation	Yes  • Oriented to Person	Yes  • Attention / Concentration	Normal  • Estimated Intelligence	Average  • Recent Memory	Normal  • Remote Memory	Normal  • Fund of Knowledge	Normal  • Language	No abnormalities noted  • Judgment (regarding everyday events)	Fair  • Insight (regarding psychiatric illness)	Fair    SUICIDALITY:   • Suicidality (Interval)	thoughts to take pills    HOMICIDALITY/AGGRESSION:   • Homicidality/Aggression	none known    DIAGNOSIS DSM-V:    Psychiatric Diagnosis (Corresponds to DSM-IV Axis I, II):   HEALTH ISSUES - PROBLEM Dx:  Benzodiazepine misuse  Severe episode of recurrent major depressive disorder, without psychotic features           Medical Diagnosis (Corresponds to DSM-IV Axis III):  • Axis III	Hypertension dyslipidemia      ASSESSMENT OF CURRENT CONDITION:   Summary (include case differential, formulation and patient response to therapy): no change from yesterday Initially presented to [facility] seeking admission Aware alternate facilities maybe considered    Risk Assessment (consider static vs modifiable risk factors and protective factors; comment on level of risk for dangerous behavior): high risk active urges recent ingestion    PLAN  9.13 admission to inpatient psych unit

## 2019-01-12 NOTE — ED ADULT NURSE NOTE - CAS EDN DISCHARGE ASSESSMENT
Drowsy/Alert and oriented to person, place and time Drowsy/cooperative with transfer/Alert and oriented to person, place and time

## 2019-01-12 NOTE — ED ADULT NURSE REASSESSMENT NOTE - COMFORT CARE
darkened lights/ambulated to bathroom/plan of care explained/repositioned/wait time explained/po fluids offered
plan of care explained
plan of care explained/meal provided

## 2019-01-12 NOTE — ED ADULT NURSE REASSESSMENT NOTE - NS ED NURSE REASSESS COMMENT FT1
Patient provided breakfast but declined to ate.  No attempts to harm self ro others and safety maintained.  Patient complied with medications as prescribed.

## 2019-01-12 NOTE — ED ADULT NURSE REASSESSMENT NOTE - NS ED NURSE REASSESS COMMENT FT1
Report given to accepting  RNCoy, PIV d/c'd per protocol, pt safely transferred to unit with assist of SNA and security.

## 2019-01-12 NOTE — ED ADULT NURSE NOTE - CHIEF COMPLAINT QUOTE
Patient presents to ER for medical evaluation, patient reports taking 10 xanax pills this AM, reports HX of depression, patient picked up at [facility], he was there in attempt to get admitted, patient states he was trying to put a stop to his pain, reports weakness and dizziness.

## 2019-01-12 NOTE — ED BEHAVIORAL HEALTH NOTE - BEHAVIORAL HEALTH NOTE
SW Note: SW [place]. Hat[place] [facility] are able to accept pt. Pt made aware and in agreement. Pt given the phone to let family know. Nurse made aware to give handoff. The accepting  is Dr. Hatfield and pt to go to [address] [place] [address]. Ambulance arranged. No need for sw to follow.

## 2019-01-12 NOTE — ED ADULT NURSE REASSESSMENT NOTE - NS ED NURSE REASSESS COMMENT FT1
Received pt AoX3, calm and in yellow gowns. Pt cooperative with security check. pt went to [facility] for medical clearance after he tried to check himself in following suicide attempt. Pt reports he took 10 0.5mg xanax, 6 0.25mg Prozac last night and then this morning at 11am he took 10 more xanax and 3  more Prozac as a SA. Pt reports he "wanted the pain to go away". He states due to financial and social stressors (from ex-wife regarding money and custody over children). Pt has a hx of depression,  hx of SAs by OD, and psychiatric hospitalizations. pt reports his last attempt was around this time last year when he was admitted to [facility] following an attempt. pt reports he was recently at [facility] for panic attacks, Pt admits to using cocaine Monday. Pt also admits to having AH/VH for the past two days. pt states when he sees brown he hears voices saying "It doesn't matter" and when he sees white he can hear his children voices. Pt states he feels safe in  and contracts for safety. Pt c/o HA. MD Davidson made aware. Tylenol 650mg PO ordered. No further pt complaints a this time. Received pt AoX3, calm and in yellow gowns. Pt cooperative with security check. pt went to [facility] for medical clearance after he tried to check himself in following suicide attempt. Pt reports he took 10 0.5mg xanax, 6 0.25mg Prozac last night and then this morning at 11am he took 10 more xanax and 3  more Prozac as a SA. Pt reports he "wanted the pain to go away". He states stressors are due to financial and social stressors (from ex-wife regarding money and custody over children). Pt has a hx of depression,  hx of SAs by OD, and psychiatric hospitalizations. pt reports his last attempt was around this time last year when he was admitted to [facility]. pt reports he was recently at [facility] for panic attacks, Pt admits to using cocaine Monday. Pt also admits to having AH/VH for the past two days. pt states when he sees brown he hears voices saying "It doesn't matter" and when he sees white he can hear his children voices. pt states he has been having difficulties sleeping at night and only gets 2hr/night.. Pt c/o DELGADO. MD Davidson made aware. Tylenol 650mg PO ordered. No further pt complaints a this time. Pt contracts for safety. Will continue to monitor the pt and maintain safety.

## 2019-01-12 NOTE — ED ADULT NURSE REASSESSMENT NOTE - NS ED NURSE REASSESS COMMENT FT1
Assumed care of patient at 0720.  Patient resting in bed awake.  Oriented to staff and educated about plan of care.  No pain or distress noted.  No attempts to harm self or others and safety maintained.

## 2019-01-12 NOTE — ED ADULT NURSE REASSESSMENT NOTE - GENERAL PATIENT STATE
resting/sleeping/cooperative/comfortable appearance
comfortable appearance/resting/sleeping/cooperative
resting/sleeping/comfortable appearance

## 2019-01-12 NOTE — ED BEHAVIORAL HEALTH NOTE - BEHAVIORAL HEALTH NOTE
JERO Note: Jero spoke to Miley from [facility], Ms. Mason BARTH from [facility], Emma from [facility], Lashawn from [place] and no beds available. JERO spoke to Lori from [facility] and there is a male bed available. Clinicals sent for review by Dr. Hatfield. SW to follow.

## 2019-01-13 DIAGNOSIS — I10 ESSENTIAL (PRIMARY) HYPERTENSION: ICD-10-CM

## 2019-01-13 DIAGNOSIS — F33.2 MAJOR DEPRESSIVE DISORDER, RECURRENT SEVERE WITHOUT PSYCHOTIC FEATURES: ICD-10-CM

## 2019-01-13 DIAGNOSIS — F17.200 NICOTINE DEPENDENCE, UNSPECIFIED, UNCOMPLICATED: ICD-10-CM

## 2019-01-13 DIAGNOSIS — E78.5 HYPERLIPIDEMIA, UNSPECIFIED: ICD-10-CM

## 2019-01-13 DIAGNOSIS — Z29.9 ENCOUNTER FOR PROPHYLACTIC MEASURES, UNSPECIFIED: ICD-10-CM

## 2019-01-13 LAB
ALBUMIN SERPL ELPH-MCNC: 3.7 G/DL — SIGNIFICANT CHANGE UP (ref 3.3–5)
ALP SERPL-CCNC: 49 U/L — SIGNIFICANT CHANGE UP (ref 30–120)
ALT FLD-CCNC: 41 U/L DA — SIGNIFICANT CHANGE UP (ref 10–60)
ANION GAP SERPL CALC-SCNC: 10 MMOL/L — SIGNIFICANT CHANGE UP (ref 5–17)
AST SERPL-CCNC: 12 U/L — SIGNIFICANT CHANGE UP (ref 10–40)
BILIRUB DIRECT SERPL-MCNC: 0.2 MG/DL — SIGNIFICANT CHANGE UP (ref 0–0.2)
BILIRUB INDIRECT FLD-MCNC: 0.5 MG/DL — SIGNIFICANT CHANGE UP (ref 0.2–1)
BILIRUB SERPL-MCNC: 0.7 MG/DL — SIGNIFICANT CHANGE UP (ref 0.2–1.2)
BUN SERPL-MCNC: 22 MG/DL — SIGNIFICANT CHANGE UP (ref 7–23)
CALCIUM SERPL-MCNC: 9 MG/DL — SIGNIFICANT CHANGE UP (ref 8.4–10.5)
CHLORIDE SERPL-SCNC: 105 MMOL/L — SIGNIFICANT CHANGE UP (ref 96–108)
CHOLEST SERPL-MCNC: 175 MG/DL — SIGNIFICANT CHANGE UP (ref 10–199)
CO2 SERPL-SCNC: 26 MMOL/L — SIGNIFICANT CHANGE UP (ref 22–31)
CREAT SERPL-MCNC: 1.09 MG/DL — SIGNIFICANT CHANGE UP (ref 0.5–1.3)
GGT SERPL-CCNC: 31 U/L — SIGNIFICANT CHANGE UP (ref 9–50)
GLUCOSE SERPL-MCNC: 100 MG/DL — HIGH (ref 70–99)
HBA1C BLD-MCNC: 5.6 % — SIGNIFICANT CHANGE UP (ref 4–5.6)
HDLC SERPL-MCNC: 28 MG/DL — LOW
LIPID PNL WITH DIRECT LDL SERPL: 99 MG/DL — SIGNIFICANT CHANGE UP
MAGNESIUM SERPL-MCNC: 2.3 MG/DL — SIGNIFICANT CHANGE UP (ref 1.6–2.6)
PHOSPHATE SERPL-MCNC: 3.6 MG/DL — SIGNIFICANT CHANGE UP (ref 2.5–4.5)
POTASSIUM SERPL-MCNC: 3.6 MMOL/L — SIGNIFICANT CHANGE UP (ref 3.5–5.3)
POTASSIUM SERPL-SCNC: 3.6 MMOL/L — SIGNIFICANT CHANGE UP (ref 3.5–5.3)
PROT SERPL-MCNC: 6.9 G/DL — SIGNIFICANT CHANGE UP (ref 6–8.3)
SODIUM SERPL-SCNC: 141 MMOL/L — SIGNIFICANT CHANGE UP (ref 135–145)
T PALLIDUM AB TITR SER: NEGATIVE — SIGNIFICANT CHANGE UP
TOTAL CHOLESTEROL/HDL RATIO MEASUREMENT: 6.3 RATIO — SIGNIFICANT CHANGE UP (ref 3.4–9.6)
TRIGL SERPL-MCNC: 238 MG/DL — HIGH (ref 10–149)

## 2019-01-13 PROCEDURE — 90792 PSYCH DIAG EVAL W/MED SRVCS: CPT

## 2019-01-13 RX ORDER — OMEGA-3 ACID ETHYL ESTERS 1 G
2 CAPSULE ORAL
Qty: 0 | Refills: 0 | Status: DISCONTINUED | OUTPATIENT
Start: 2019-01-13 | End: 2019-02-18

## 2019-01-13 RX ORDER — FOLIC ACID 0.8 MG
1 TABLET ORAL DAILY
Qty: 0 | Refills: 0 | Status: DISCONTINUED | OUTPATIENT
Start: 2019-01-13 | End: 2019-02-18

## 2019-01-13 RX ADMIN — ATORVASTATIN CALCIUM 20 MILLIGRAM(S): 80 TABLET, FILM COATED ORAL at 20:23

## 2019-01-13 RX ADMIN — Medication 2 GRAM(S): at 20:23

## 2019-01-13 RX ADMIN — Medication 650 MILLIGRAM(S): at 12:51

## 2019-01-13 RX ADMIN — LOSARTAN POTASSIUM 100 MILLIGRAM(S): 100 TABLET, FILM COATED ORAL at 08:28

## 2019-01-13 RX ADMIN — Medication 650 MILLIGRAM(S): at 13:30

## 2019-01-13 RX ADMIN — Medication 25 MILLIGRAM(S): at 20:23

## 2019-01-13 RX ADMIN — Medication 25 MILLIGRAM(S): at 08:28

## 2019-01-13 RX ADMIN — Medication 25 MILLIGRAM(S): at 13:04

## 2019-01-13 RX ADMIN — Medication 100 MILLIGRAM(S): at 20:23

## 2019-01-13 RX ADMIN — Medication 81 MILLIGRAM(S): at 08:28

## 2019-01-13 NOTE — H&P ADULT - NSHPLABSRESULTS_GEN_ALL_CORE
15.5   5.4   )-----------( 215      ( 11 Jan 2019 20:48 )             43.4     13 Jan 2019 07:24    141    |  105    |  22     ----------------------------<  100    3.6     |  26     |  1.09     Ca    9.0        13 Jan 2019 07:24  Phos  3.6       13 Jan 2019 07:24  Mg     2.3       13 Jan 2019 07:24    TPro  6.9    /  Alb  3.7    /  TBili  0.7    /  DBili  0.2    /  AST  12     /  ALT  41     /  AlkPhos  49     13 Jan 2019 07:24    01-13 [id number] 5.6    01-13 Chol 175 LDL 99 HDL 28<L> Trig 238<H>

## 2019-01-13 NOTE — BEHAVIORAL HEALTH ASSESSMENT NOTE - SUMMARY
43M, , domiciled with mother, works as , hx of depression, hx of suicide attempt via OD, hx of etoh abuse and rehab in 1999, last hopsitalization at [facility] December 2017 for SI s/p breakup with girlfriend, with pmhx HTN presents BIBA from outpatient [facility] psych for medical clearance after he tried to check himself in following OD on pills in context of financial, work and social stressors (from ex-wife regarding money and custody over children). Patient was admitted on a voluntary 9.13

## 2019-01-13 NOTE — H&P ADULT - NSHPREVIEWOFSYSTEMS_GEN_ALL_CORE
REVIEW OF SYSTEMS:  CONSTITUTIONAL: No fever, weight loss, or fatigue  EYES: No eye pain, visual disturbances, or discharge  ENMT:  No difficulty hearing, tinnitus, vertigo; No sinus or throat pain  NECK: No pain or stiffness  BREASTS: No pain, masses, or nipple discharge  RESPIRATORY: No cough, wheezing, chills or hemoptysis; No shortness of breath  CARDIOVASCULAR: No chest pain, palpitations, dizziness, or leg swelling  GASTROINTESTINAL: No abdominal or epigastric pain. No nausea, vomiting, or hematemesis; No diarrhea or constipation. No melena or hematochezia.  GENITOURINARY: No dysuria, frequency, hematuria, or incontinence  NEUROLOGICAL: No headaches, memory loss, loss of strength, numbness, or tremors  SKIN: No itching, burning, rashes, or lesions   LYMPH NODES: No enlarged glands  ENDOCRINE: No heat or cold intolerance; No hair loss; No polydipsia or polyuria  MUSCULOSKELETAL: No joint pain or swelling; No muscle, back, or extremity pain  PSYCHIATRIC: + depression,  HEME/LYMPH: No easy bruising, or bleeding gums  ALLERGY AND IMMUNOLOGIC: No hives or eczema REVIEW OF SYSTEMS:  CONSTITUTIONAL: No fever, weight loss, or fatigue  EYES: No eye pain, visual disturbances, or discharge  ENMT:  No difficulty hearing, tinnitus, vertigo; No sinus or throat pain  NECK: No pain or stiffness  BREASTS: No pain, masses, or nipple discharge  RESPIRATORY: No cough, wheezing, chills or hemoptysis; No shortness of breath  CARDIOVASCULAR: No chest pain, palpitations, dizziness, or leg swelling  GASTROINTESTINAL: No abdominal or epigastric pain. No nausea, vomiting, or hematemesis; No diarrhea or constipation. No melena or hematochezia.  GENITOURINARY: No dysuria, frequency, hematuria, or incontinence  NEUROLOGICAL: No headaches, memory loss, loss of strength, numbness, or tremors  SKIN: No itching, burning, rashes, or lesions   LYMPH NODES: No enlarged glands  ENDOCRINE: No heat or cold intolerance; No hair loss; No polydipsia or polyuria  MUSCULOSKELETAL: No joint pain or swelling; No muscle, back, or extremity pain  HEME/LYMPH: No easy bruising, or bleeding gums  ALLERGY AND IMMUNOLOGIC: No hives or eczema

## 2019-01-13 NOTE — H&P ADULT - NSHPSOCIALHISTORY_GEN_ALL_CORE
Social History:    Marital Status:   Occupation: Works as .   Lives with:  Mother    Substance Use : Denies  Tobacco Usage:  (   ) never smoked   (   ) former smoker   (X) current smoker  (     ) pack year  (        ) last tobacco use date  Alcohol Usage: Denies

## 2019-01-13 NOTE — BEHAVIORAL HEALTH ASSESSMENT NOTE - RISK ASSESSMENT
currently at high risk to self given highly impulsive, suicide attempt with overdose on medications, stressors, male gender, mood episode

## 2019-01-13 NOTE — BEHAVIORAL HEALTH ASSESSMENT NOTE - HPI (INCLUDE ILLNESS QUALITY, SEVERITY, DURATION, TIMING, CONTEXT, MODIFYING FACTORS, ASSOCIATED SIGNS AND SYMPTOMS)
43M, , domiciled with mother, works as , hx of depression, hx of suicide attempt via OD, hx of etoh abuse and rehab in 1999, last hopsitalization at [facility] December 2017 for SI s/p breakup with girlfriend, with pmhx HTN presented to the [facility] Emergency Department BIB EMS from outpatient [facility] psych for medical clearance after he tried to check himself in following OD on pills. Pt states he has financial, work and social stressors (from ex-wife regarding money and custody over children). He has been particularly anxious causing significant sleep issues and persistent discomfort. He admits to declining mood over the holidays due to feeling overwhelmed but cannot identify an acute stressor. He took 10 pills of 1mg xanax and 6 pills of 25 mg zoloft yesterday around 11 pm as he felt increased anxiety which he describes as chest pain, palpitations, SOB and anxiety. (UTOX "-" in ED). This morning around 11 am he took another 3 pills of 1mg xanax and another 3 pills of 25 mg zoloft, prior to arriving at [facility]. He denies concomitant drugs/EtOH use - last drink 3 days ago. He endorses anhedonia, hopelessness, withdrawing and isolating over recent weeks. He denies hallucinations/psychosis; no delusions or paranoia elicited.    Attempted to get collateral from mother Juanis but unavailable - [phone] 43M, , domiciled with mother, works as , hx of depression, hx of suicide attempt via OD, hx of etoh abuse and rehab in 1999, last hopsitalization at [facility] December 2017 for SI s/p breakup with girlfriend, with pmhx HTN presented to the [facility] Emergency Department BIB EMS from outpatient [facility] psych for medical clearance after he tried to check himself in following OD on pills. Pt states he has financial, work and social stressors (from ex-wife regarding money and custody over children). He has been particularly anxious causing significant sleep issues and persistent discomfort. He admits to declining mood over the holidays due to feeling overwhelmed but cannot identify an acute stressor. He took 10 pills of 1mg xanax and 6 pills of 25 mg zoloft yesterday around 11 pm as he felt increased anxiety which he describes as chest pain, palpitations, SOB and anxiety. (UTOX "-" in ED). This morning around 11 am he took another 3 pills of 1mg xanax and another 3 pills of 25 mg zoloft, prior to arriving at [facility]. He denies concomitant drugs/EtOH use - last drink 3 days ago. He endorses anhedonia, hopelessness, withdrawing and isolating over recent weeks. He denies hallucinations/psychosis; no delusions or paranoia elicited.    Attempted to get collateral from mother Juanis but unavailable - [phone]    On Unit 1N, Patient has thus far been calm, cooperative, engages and keeps to himself in his room. Still feels generally tired today and is napping in his bed. Ate breakfast. No complaints.

## 2019-01-13 NOTE — H&P ADULT - PROBLEM SELECTOR PLAN 2
Continue patient on Lipitor.  Lipid panel noted.   Triglyceride still high despite Lipitor.  Will add Lovaza.

## 2019-01-13 NOTE — BEHAVIORAL HEALTH ASSESSMENT NOTE - NSBHREFEROUTSIDE_PSY_A_CORE_FT
transferred from [facility] Emergency Room where he was BIBA by EMS from outpatient [facility] for medical clearance after Patient tried to check himself in following OD on pills in a suicide attempt. Patient was seen by [facility] NP

## 2019-01-13 NOTE — H&P ADULT - ASSESSMENT
43 years old male with past medical history of Hypertension, Hyperlipidemia, Tobacco use disorder, Depression, who is admitted to in patient psychiatry unit for stabilization of his medications and mood. Patient is seen for medical history and physical.

## 2019-01-13 NOTE — H&P ADULT - NSHPPHYSICALEXAM_GEN_ALL_CORE
Vital Signs Last 24 Hrs  T(C): --  T(F): 98.4  HR: 86/min  BP: 110/70  BP(mean): --  RR: 18/min  SpO2: 98%    PHYSICAL EXAM:  GENERAL: NAD, well-groomed, well-developed  HEAD:  Atraumatic, Normocephalic  EYES: EOMI, PERRLA, conjunctiva and sclera clear  ENMT: No tonsillar erythema, exudates, or enlargement; Moist mucous membranes, Good dentition, No lesions  NECK: Supple, No JVD, Normal thyroid  CHEST/LUNG: Clear to auscultation bilaterally; No rales, rhonchi, wheezing, or rubs  HEART: Regular rate and rhythm; No murmurs, rubs, or gallops  ABDOMEN: Soft, Nontender, Nondistended; Bowel sounds present  EXTREMITIES:  2+ Peripheral Pulses, No clubbing, cyanosis, or edema  MS: No joint swelling or deformity.   LYMPH: No lymphadenopathy noted  SKIN: No rashes or lesions  NERVOUS SYSTEM:  Motor Strength 4/5 B/L upper and lower extremities; DTRs 2+ intact and symmetric  PSYCH:  Alert & Oriented X3.

## 2019-01-13 NOTE — BEHAVIORAL HEALTH ASSESSMENT NOTE - OTHER PAST PSYCHIATRIC HISTORY (INCLUDE DETAILS REGARDING ONSET, COURSE OF ILLNESS, INPATIENT/OUTPATIENT TREATMENT)
hx of depression, hx of suicide attempt via OD, hx of etoh abuse and rehab in 1999, last hopsitalization at [facility] December 2017 for SI s/p breakup with girlfriend

## 2019-01-13 NOTE — BEHAVIORAL HEALTH ASSESSMENT NOTE - NSBHADMITMEDEDUDETAILS_A_CORE FT
hold starting standing psychiatric medications at this time; allow patient to clear all substances he ingested / overdosed on. PRNs only. Continue HTN medications

## 2019-01-13 NOTE — BEHAVIORAL HEALTH ASSESSMENT NOTE - NSBHADMITIPSTRENGTH_PSY_A_CORE
Attempting to realize his/her potential/Cooperative with treatment/Has supportive interpersonal relationships with family, friends or peers/Motivated and ready for change/Has access to housing/residential stability/In good physical health

## 2019-01-14 DIAGNOSIS — F10.10 ALCOHOL ABUSE, UNCOMPLICATED: ICD-10-CM

## 2019-01-14 PROCEDURE — 99233 SBSQ HOSP IP/OBS HIGH 50: CPT

## 2019-01-14 RX ORDER — SERTRALINE 25 MG/1
50 TABLET, FILM COATED ORAL DAILY
Qty: 0 | Refills: 0 | Status: DISCONTINUED | OUTPATIENT
Start: 2019-01-15 | End: 2019-01-16

## 2019-01-14 RX ORDER — HALOPERIDOL DECANOATE 100 MG/ML
5 INJECTION INTRAMUSCULAR EVERY 6 HOURS
Qty: 0 | Refills: 0 | Status: DISCONTINUED | OUTPATIENT
Start: 2019-01-14 | End: 2019-02-18

## 2019-01-14 RX ORDER — DIPHENHYDRAMINE HCL 50 MG
25 CAPSULE ORAL EVERY 6 HOURS
Qty: 0 | Refills: 0 | Status: DISCONTINUED | OUTPATIENT
Start: 2019-01-14 | End: 2019-02-08

## 2019-01-14 RX ADMIN — Medication 2 GRAM(S): at 08:43

## 2019-01-14 RX ADMIN — Medication 650 MILLIGRAM(S): at 09:11

## 2019-01-14 RX ADMIN — Medication 25 MILLIGRAM(S): at 13:24

## 2019-01-14 RX ADMIN — Medication 650 MILLIGRAM(S): at 10:00

## 2019-01-14 RX ADMIN — Medication 25 MILLIGRAM(S): at 21:12

## 2019-01-14 RX ADMIN — Medication 650 MILLIGRAM(S): at 19:00

## 2019-01-14 RX ADMIN — Medication 2 GRAM(S): at 21:13

## 2019-01-14 RX ADMIN — Medication 81 MILLIGRAM(S): at 08:43

## 2019-01-14 RX ADMIN — Medication 650 MILLIGRAM(S): at 18:04

## 2019-01-14 RX ADMIN — ATORVASTATIN CALCIUM 20 MILLIGRAM(S): 80 TABLET, FILM COATED ORAL at 21:12

## 2019-01-14 RX ADMIN — Medication 100 MILLIGRAM(S): at 21:14

## 2019-01-14 RX ADMIN — LOSARTAN POTASSIUM 100 MILLIGRAM(S): 100 TABLET, FILM COATED ORAL at 08:44

## 2019-01-14 RX ADMIN — Medication 25 MILLIGRAM(S): at 08:44

## 2019-01-14 RX ADMIN — Medication 1 TABLET(S): at 08:44

## 2019-01-14 RX ADMIN — Medication 1 MILLIGRAM(S): at 08:44

## 2019-01-14 NOTE — PROGRESS NOTE BEHAVIORAL HEALTH - RISK ASSESSMENT
currently at high risk to self given highly impulsive, suicide attempt with overdose on medications, stressors, male gender, mood episode, ETOH abuse

## 2019-01-14 NOTE — PROGRESS NOTE BEHAVIORAL HEALTH - NSBHADMITMEDEDUDETAILS_A_CORE FT
Psychoeducation provided re: resuming Zoloft at 50mg tomorrow, Benadryl prn for anxiety,  and potential benefits and SE of Zoloft and Benadryl with teach back verbalized.  Patient declines Care Notes, as he reports he has been on Zoloft for a while and is familiar with it, and felt it helped him, and did not have any SE other than occasional mild sedation at Zoloft 75mg.

## 2019-01-14 NOTE — PROGRESS NOTE BEHAVIORAL HEALTH - NSBHFUPDXUPDATEFT_PSY_A_CORE
Anthony reports hx of ETOH abuse, including inpatient rehab, and reports 13 years sobriety in AA.  He reports he relapsed, was drinking 1-2x per week

## 2019-01-14 NOTE — PROGRESS NOTE BEHAVIORAL HEALTH - NSBHFUPINTERVALHXFT_PSY_A_CORE
Met with and evaluated patient.  Chart reviewed and case discussed in tx team meeting. No significant interval events are reported.  Patient reports hx of ETOH abuse, with 13 years sobriety in AA, but left AA, and reports began binge drinking due to stress at job and due to relationship problems.  He reports he was drinking 1-2 times per week, and reports he wants to maintain sobriety, and would resume AA once he is back home. He also reports he reduced his Zoloft to 25mg (from 75mg) at home due to feeling "too sedated sometimes".  He reports after overdosing he had an episode of AH (has not had AH before or since) in which he heard his children's voices, and then went and told his mother to take him to [facility], was then transferred to [facility] for medical care, and now transferred to [facility] for psychiatric care and safety.  He reports he is depressed and anxious, and wants to resume Zoloft, which he reports was helpful in the past.  Resume Zoloft at 50mg qam.  Benadryl 25mg ordered q6h prn for anxiety. He denies current  HI.  He reports some passive SI, but denies a plan, and reports he will tell staff if he feels he will harm self or others.  He denies current AH or VH.  No Rx SE are noted or reported

## 2019-01-15 LAB — T PALLIDUM AB TITR SER: NEGATIVE — SIGNIFICANT CHANGE UP

## 2019-01-15 PROCEDURE — 99233 SBSQ HOSP IP/OBS HIGH 50: CPT

## 2019-01-15 RX ORDER — FLUTICASONE PROPIONATE 50 MCG
1 SPRAY, SUSPENSION NASAL
Qty: 0 | Refills: 0 | Status: COMPLETED | OUTPATIENT
Start: 2019-01-15 | End: 2019-01-22

## 2019-01-15 RX ADMIN — Medication 2 GRAM(S): at 08:41

## 2019-01-15 RX ADMIN — Medication 2 GRAM(S): at 21:16

## 2019-01-15 RX ADMIN — SERTRALINE 50 MILLIGRAM(S): 25 TABLET, FILM COATED ORAL at 08:41

## 2019-01-15 RX ADMIN — Medication 81 MILLIGRAM(S): at 08:41

## 2019-01-15 RX ADMIN — Medication 650 MILLIGRAM(S): at 13:03

## 2019-01-15 RX ADMIN — Medication 650 MILLIGRAM(S): at 22:13

## 2019-01-15 RX ADMIN — Medication 25 MILLIGRAM(S): at 21:16

## 2019-01-15 RX ADMIN — Medication 1 SPRAY(S): at 21:19

## 2019-01-15 RX ADMIN — Medication 25 MILLIGRAM(S): at 13:02

## 2019-01-15 RX ADMIN — Medication 100 MILLIGRAM(S): at 21:16

## 2019-01-15 RX ADMIN — Medication 1 MILLIGRAM(S): at 08:42

## 2019-01-15 RX ADMIN — Medication 25 MILLIGRAM(S): at 08:42

## 2019-01-15 RX ADMIN — ATORVASTATIN CALCIUM 20 MILLIGRAM(S): 80 TABLET, FILM COATED ORAL at 21:16

## 2019-01-15 RX ADMIN — Medication 650 MILLIGRAM(S): at 14:13

## 2019-01-15 RX ADMIN — Medication 1 TABLET(S): at 08:43

## 2019-01-15 RX ADMIN — Medication 650 MILLIGRAM(S): at 21:18

## 2019-01-15 RX ADMIN — LOSARTAN POTASSIUM 100 MILLIGRAM(S): 100 TABLET, FILM COATED ORAL at 08:42

## 2019-01-15 NOTE — PROGRESS NOTE BEHAVIORAL HEALTH - NSBHFUPINTERVALHXFT_PSY_A_CORE
Met with and evaluated patient.  Chart reviewed and case discussed in tx team meeting. No significant interval events are reported.  Patient reports he went to  on the unit last night and it was very helpful to him.  He reports he will call his former sponsor in , as the sponsor was very helpful to him .He denies any SI today and CAMS assessment was done which shows low risk for suicide, but patient reports psychological pain and stress due to his life circumstances.  He reports he wants to live, and is able to ID reasons to live:  "my children, my family, my girlfriend"  He denies access to weapons, and reports he is hopeful verbal therapy can help him. He reports he has begun reading again in the hospital and enjoys that. He is able to ID coping skills that help him such as listening to music and spending time with his children.  He reports if he has SI at home he would tell his mother, and/or call the  Crisis Center, the Suicide Hotline or go to the [facility] in [place].  He was given phone numbers for the [facility] (and is aware he can call there if he needs to talk to someone), the Suicide Hotline and the phone number and address for the [facility] in [place]. He reports that his suicide attempts have occurred when he was drinking, and he plans to maintain sobriety, and verbalizes understanding of the risks of ETOH use/abuse.     He denies current AH or VH.  No Rx SE are noted or reported Met with and evaluated patient.  Chart reviewed and case discussed in tx team meeting. No significant interval events are reported.  Patient reports he went to  on the unit last night and it was very helpful to him.  He reports he will call his former sponsor in , as the sponsor was very helpful to him .He denies any SI today and CAMS assessment was done which shows low risk for suicide, but patient reports psychological pain and stress due to his life circumstances.  He reports he wants to live, and is able to ID reasons to live:  "my children, my family, my girlfriend"  He denies access to weapons, and reports he is hopeful verbal therapy can help him. He reports he has begun reading again in the hospital and enjoys that. He is able to ID coping skills that help him such as listening to music and spending time with his children.  He reports if he has SI at home he would tell his mother, and/or call the  Crisis Center, the Suicide Hotline or go to the [facility] in [place].  He was given phone numbers for the  Crisis Center (and is aware he can call there if he needs to talk to someone), the Suicide Hotline and the phone number and address for the [facility] in [place]. He reports that his suicide attempts have occurred when he was drinking, and he plans to maintain sobriety, and verbalizes understanding of the risks of ETOH use/abuse.     He denies current AH or VH.  No Rx SE are noted or reported    Addendum: 01/15/2019: Patient was seen today PM, chart reviewed, and discussed in team today. He endorsed that he is 42 y/o , and domiciled with mother/step-father. He added that he was an  and worked in [employer] industry for 25 + years. He endorsed that his primary issues is drinking, he was sober for 13 years, before relapsing again. He feels that if he is with his GF he can control himself by drinking 1-2 glasses, but if he is with others, things unravel fast and he ends up losing control of his drinking. He added that he was in rehab/detox in the past but now continues to drink. he supports himself by dipping in his 401(K), he is depressed due to divorce, not seeing his children, and financial losses. He added that he tried to commit suicide, 3 times mostly OD on pills. He is willing  to refrain from Alcohol, but doesn't know how to get the required help. He denied S/H/I/P at this time.    Plan: To continue meds as ordered          LFT in AM with S. Ammonia          If LFT/Ammonia is stable to start Naltrexone PO 50 mg/day

## 2019-01-15 NOTE — PROGRESS NOTE BEHAVIORAL HEALTH - NSBHADMITMEDEDUDETAILS_A_CORE FT
Psychoeducation provided re: need for Rx/aftercare/ sobriety adherence for sx management and relapse prevention with teach back verbalized

## 2019-01-15 NOTE — PROGRESS NOTE BEHAVIORAL HEALTH - RISK ASSESSMENT
Denies current SI or HI.  Risk factors:  mood episode, some hopelessness, substance abuse, panic sx, , perceived burden at times,  Protective factors: responsibility to others, IDs reasons to live, future oriented, supportive social network/family, reports motivated for sobriety and aftercare.

## 2019-01-16 LAB
ALBUMIN SERPL ELPH-MCNC: 3.9 G/DL — SIGNIFICANT CHANGE UP (ref 3.3–5)
ALP SERPL-CCNC: 51 U/L — SIGNIFICANT CHANGE UP (ref 30–120)
ALT FLD-CCNC: 39 U/L DA — SIGNIFICANT CHANGE UP (ref 10–60)
AMMONIA BLD-MCNC: 14 UMOL/L — SIGNIFICANT CHANGE UP (ref 11–32)
AST SERPL-CCNC: 15 U/L — SIGNIFICANT CHANGE UP (ref 10–40)
BILIRUB DIRECT SERPL-MCNC: 0.2 MG/DL — SIGNIFICANT CHANGE UP (ref 0–0.2)
BILIRUB INDIRECT FLD-MCNC: 0.4 MG/DL — SIGNIFICANT CHANGE UP (ref 0.2–1)
BILIRUB SERPL-MCNC: 0.6 MG/DL — SIGNIFICANT CHANGE UP (ref 0.2–1.2)
PROT SERPL-MCNC: 7.3 G/DL — SIGNIFICANT CHANGE UP (ref 6–8.3)
TSH SERPL-MCNC: 3.95 UIU/ML — SIGNIFICANT CHANGE UP (ref 0.27–4.2)

## 2019-01-16 PROCEDURE — 99232 SBSQ HOSP IP/OBS MODERATE 35: CPT

## 2019-01-16 RX ORDER — TRAZODONE HCL 50 MG
150 TABLET ORAL AT BEDTIME
Qty: 0 | Refills: 0 | Status: DISCONTINUED | OUTPATIENT
Start: 2019-01-16 | End: 2019-01-17

## 2019-01-16 RX ORDER — SERTRALINE 25 MG/1
75 TABLET, FILM COATED ORAL DAILY
Qty: 0 | Refills: 0 | Status: DISCONTINUED | OUTPATIENT
Start: 2019-01-17 | End: 2019-01-29

## 2019-01-16 RX ORDER — NALTREXONE HYDROCHLORIDE 50 MG/1
50 TABLET, FILM COATED ORAL DAILY
Qty: 0 | Refills: 0 | Status: DISCONTINUED | OUTPATIENT
Start: 2019-01-17 | End: 2019-01-18

## 2019-01-16 RX ORDER — IBUPROFEN 200 MG
400 TABLET ORAL EVERY 6 HOURS
Qty: 0 | Refills: 0 | Status: DISCONTINUED | OUTPATIENT
Start: 2019-01-16 | End: 2019-02-18

## 2019-01-16 RX ADMIN — Medication 25 MILLIGRAM(S): at 21:17

## 2019-01-16 RX ADMIN — ATORVASTATIN CALCIUM 20 MILLIGRAM(S): 80 TABLET, FILM COATED ORAL at 21:17

## 2019-01-16 RX ADMIN — Medication 81 MILLIGRAM(S): at 08:32

## 2019-01-16 RX ADMIN — Medication 2 GRAM(S): at 08:32

## 2019-01-16 RX ADMIN — Medication 25 MILLIGRAM(S): at 08:33

## 2019-01-16 RX ADMIN — Medication 1 SPRAY(S): at 08:32

## 2019-01-16 RX ADMIN — Medication 1 TABLET(S): at 08:32

## 2019-01-16 RX ADMIN — Medication 1 MILLIGRAM(S): at 08:32

## 2019-01-16 RX ADMIN — Medication 25 MILLIGRAM(S): at 12:24

## 2019-01-16 RX ADMIN — SERTRALINE 50 MILLIGRAM(S): 25 TABLET, FILM COATED ORAL at 08:32

## 2019-01-16 RX ADMIN — LOSARTAN POTASSIUM 100 MILLIGRAM(S): 100 TABLET, FILM COATED ORAL at 08:33

## 2019-01-16 RX ADMIN — Medication 150 MILLIGRAM(S): at 21:17

## 2019-01-16 RX ADMIN — Medication 1 SPRAY(S): at 21:16

## 2019-01-16 RX ADMIN — Medication 2 GRAM(S): at 21:16

## 2019-01-16 NOTE — PROGRESS NOTE BEHAVIORAL HEALTH - NSBHFUPINTERVALHXFT_PSY_A_CORE
Met with and evaluated patient.  Chart reviewed and case discussed in tx team meeting. No significant interval events are reported.  Patient reports he did not sleep well last night. .He denies any SI today, but reports that he still feels depressed.  He reports his depression level as "a 3" (1=very depressed and 10=happy). He continues to discuss his motivation for sobriety, and reports he is willing to try po Naltrexone to help maintain his sobriety.   He denies any HI, AH or VH.  No Rx SE are noted or reported.  Zoloft increased to 75mg, Trazadone, increased to 100mg, and to begin Naltrexone 50mg qam

## 2019-01-16 NOTE — PROGRESS NOTE BEHAVIORAL HEALTH - NSBHADMITMEDEDUDETAILS_A_CORE FT
Psychoeducation provided re: potential benefits/SE of Naltrexone and potential for priapism with Trazadone with teach back verbalized and Care Notes given re: Naltrexone

## 2019-01-17 PROCEDURE — 99232 SBSQ HOSP IP/OBS MODERATE 35: CPT

## 2019-01-17 RX ORDER — TRAZODONE HCL 50 MG
150 TABLET ORAL AT BEDTIME
Qty: 0 | Refills: 0 | Status: DISCONTINUED | OUTPATIENT
Start: 2019-01-17 | End: 2019-01-18

## 2019-01-17 RX ADMIN — Medication 25 MILLIGRAM(S): at 08:30

## 2019-01-17 RX ADMIN — ATORVASTATIN CALCIUM 20 MILLIGRAM(S): 80 TABLET, FILM COATED ORAL at 21:06

## 2019-01-17 RX ADMIN — Medication 1 MILLIGRAM(S): at 08:30

## 2019-01-17 RX ADMIN — Medication 1 TABLET(S): at 08:29

## 2019-01-17 RX ADMIN — Medication 2 GRAM(S): at 08:29

## 2019-01-17 RX ADMIN — Medication 25 MILLIGRAM(S): at 12:58

## 2019-01-17 RX ADMIN — Medication 25 MILLIGRAM(S): at 21:05

## 2019-01-17 RX ADMIN — Medication 81 MILLIGRAM(S): at 08:29

## 2019-01-17 RX ADMIN — NALTREXONE HYDROCHLORIDE 50 MILLIGRAM(S): 50 TABLET, FILM COATED ORAL at 08:29

## 2019-01-17 RX ADMIN — Medication 1 SPRAY(S): at 21:05

## 2019-01-17 RX ADMIN — SERTRALINE 75 MILLIGRAM(S): 25 TABLET, FILM COATED ORAL at 08:30

## 2019-01-17 RX ADMIN — Medication 150 MILLIGRAM(S): at 21:04

## 2019-01-17 RX ADMIN — Medication 2 GRAM(S): at 21:03

## 2019-01-17 RX ADMIN — Medication 1 SPRAY(S): at 08:29

## 2019-01-17 RX ADMIN — LOSARTAN POTASSIUM 100 MILLIGRAM(S): 100 TABLET, FILM COATED ORAL at 08:29

## 2019-01-17 NOTE — PROGRESS NOTE BEHAVIORAL HEALTH - NSBHFUPINTERVALHXFT_PSY_A_CORE
Met with and evaluated patient.  Chart reviewed and case discussed in tx team meeting. No significant interval events are reported.  Patient reports he slept well on increased dose of Trazadone.  .He denies any SI today, and reports that he  feels less depressed.  He reports his depression level as "a 5" (1=very depressed and 10=happy). He continues to discuss his motivation for sobriety, and reports he is willing to try po Naltrexone to help maintain his sobriety.  Tolerating Naltrexone and increase in Zoloft and Trazadone well.  Patient reports he wants to go to an inpatient rehab, and SW is aware.   He denies any HI, AH or VH.  No Rx SE are noted or reported.

## 2019-01-18 PROCEDURE — 99232 SBSQ HOSP IP/OBS MODERATE 35: CPT

## 2019-01-18 RX ORDER — TRAZODONE HCL 50 MG
100 TABLET ORAL AT BEDTIME
Qty: 0 | Refills: 0 | Status: DISCONTINUED | OUTPATIENT
Start: 2019-01-18 | End: 2019-02-18

## 2019-01-18 RX ORDER — NALTREXONE HYDROCHLORIDE 50 MG/1
25 TABLET, FILM COATED ORAL DAILY
Qty: 0 | Refills: 0 | Status: DISCONTINUED | OUTPATIENT
Start: 2019-01-18 | End: 2019-01-20

## 2019-01-18 RX ADMIN — Medication 1 SPRAY(S): at 08:30

## 2019-01-18 RX ADMIN — Medication 400 MILLIGRAM(S): at 22:06

## 2019-01-18 RX ADMIN — ATORVASTATIN CALCIUM 20 MILLIGRAM(S): 80 TABLET, FILM COATED ORAL at 21:41

## 2019-01-18 RX ADMIN — Medication 1 SPRAY(S): at 21:41

## 2019-01-18 RX ADMIN — Medication 2 GRAM(S): at 21:41

## 2019-01-18 RX ADMIN — NALTREXONE HYDROCHLORIDE 25 MILLIGRAM(S): 50 TABLET, FILM COATED ORAL at 21:45

## 2019-01-18 RX ADMIN — NALTREXONE HYDROCHLORIDE 50 MILLIGRAM(S): 50 TABLET, FILM COATED ORAL at 08:31

## 2019-01-18 RX ADMIN — Medication 25 MILLIGRAM(S): at 08:31

## 2019-01-18 RX ADMIN — Medication 1 TABLET(S): at 08:31

## 2019-01-18 RX ADMIN — SERTRALINE 75 MILLIGRAM(S): 25 TABLET, FILM COATED ORAL at 08:31

## 2019-01-18 RX ADMIN — Medication 2 GRAM(S): at 08:31

## 2019-01-18 RX ADMIN — Medication 81 MILLIGRAM(S): at 08:30

## 2019-01-18 RX ADMIN — LOSARTAN POTASSIUM 100 MILLIGRAM(S): 100 TABLET, FILM COATED ORAL at 08:31

## 2019-01-18 RX ADMIN — Medication 100 MILLIGRAM(S): at 21:41

## 2019-01-18 RX ADMIN — Medication 25 MILLIGRAM(S): at 13:21

## 2019-01-18 RX ADMIN — Medication 1 MILLIGRAM(S): at 08:30

## 2019-01-18 NOTE — PROGRESS NOTE BEHAVIORAL HEALTH - NSBHFUPINTERVALHXFT_PSY_A_CORE
Met with and evaluated patient.  Chart reviewed and case discussed in tx team meeting. No significant interval events are reported, except c/o reported above.  Patient reports he slept well on increased dose of Trazadone, but discussed with patient that it may be causing the blurred vision and dizziness. .He denies any SI today, and reports that he continues to feel less depressed.  He reports his depression level as "a 6" (1=very depressed and 10=happy). He continues to discuss his motivation for sobriety, and reports he is willing to go to inpatient rehab.   He denies any HI, AH or VH.  No Rx SE are noted or reported, except the blurred vision and the slight dizziness.  Trazadone decreased to 100mg hs, Naltrexone decreased to 25mg qam.

## 2019-01-18 NOTE — PROGRESS NOTE BEHAVIORAL HEALTH - NSBHADMITMEDEDUDETAILS_A_CORE FT
Psychoeducation provided re: need to tell staff if he has any physical c/o and that doses of Naltrexone and Trazadone are being decreased with teach back verbalized

## 2019-01-19 RX ADMIN — Medication 81 MILLIGRAM(S): at 08:49

## 2019-01-19 RX ADMIN — ATORVASTATIN CALCIUM 20 MILLIGRAM(S): 80 TABLET, FILM COATED ORAL at 20:02

## 2019-01-19 RX ADMIN — Medication 400 MILLIGRAM(S): at 19:54

## 2019-01-19 RX ADMIN — Medication 25 MILLIGRAM(S): at 08:53

## 2019-01-19 RX ADMIN — Medication 1 MILLIGRAM(S): at 08:52

## 2019-01-19 RX ADMIN — Medication 100 MILLIGRAM(S): at 19:54

## 2019-01-19 RX ADMIN — SERTRALINE 75 MILLIGRAM(S): 25 TABLET, FILM COATED ORAL at 08:49

## 2019-01-19 RX ADMIN — NALTREXONE HYDROCHLORIDE 25 MILLIGRAM(S): 50 TABLET, FILM COATED ORAL at 08:50

## 2019-01-19 RX ADMIN — Medication 2 GRAM(S): at 08:52

## 2019-01-19 RX ADMIN — Medication 1 SPRAY(S): at 08:53

## 2019-01-19 RX ADMIN — Medication 1 SPRAY(S): at 20:02

## 2019-01-19 RX ADMIN — Medication 2 GRAM(S): at 20:03

## 2019-01-19 RX ADMIN — Medication 400 MILLIGRAM(S): at 20:29

## 2019-01-19 RX ADMIN — LOSARTAN POTASSIUM 100 MILLIGRAM(S): 100 TABLET, FILM COATED ORAL at 08:52

## 2019-01-19 RX ADMIN — Medication 1 TABLET(S): at 08:55

## 2019-01-20 PROCEDURE — 99233 SBSQ HOSP IP/OBS HIGH 50: CPT

## 2019-01-20 RX ADMIN — ATORVASTATIN CALCIUM 20 MILLIGRAM(S): 80 TABLET, FILM COATED ORAL at 20:44

## 2019-01-20 RX ADMIN — Medication 81 MILLIGRAM(S): at 08:59

## 2019-01-20 RX ADMIN — Medication 400 MILLIGRAM(S): at 13:04

## 2019-01-20 RX ADMIN — NALTREXONE HYDROCHLORIDE 25 MILLIGRAM(S): 50 TABLET, FILM COATED ORAL at 08:59

## 2019-01-20 RX ADMIN — SERTRALINE 75 MILLIGRAM(S): 25 TABLET, FILM COATED ORAL at 09:02

## 2019-01-20 RX ADMIN — Medication 1 MILLIGRAM(S): at 09:02

## 2019-01-20 RX ADMIN — Medication 1 SPRAY(S): at 20:44

## 2019-01-20 RX ADMIN — LOSARTAN POTASSIUM 100 MILLIGRAM(S): 100 TABLET, FILM COATED ORAL at 09:02

## 2019-01-20 RX ADMIN — Medication 100 MILLIGRAM(S): at 20:44

## 2019-01-20 RX ADMIN — Medication 2 GRAM(S): at 08:59

## 2019-01-20 RX ADMIN — Medication 25 MILLIGRAM(S): at 20:44

## 2019-01-20 RX ADMIN — Medication 25 MILLIGRAM(S): at 13:05

## 2019-01-20 RX ADMIN — Medication 2 GRAM(S): at 20:44

## 2019-01-20 RX ADMIN — Medication 1 TABLET(S): at 09:02

## 2019-01-20 RX ADMIN — Medication 25 MILLIGRAM(S): at 09:02

## 2019-01-20 RX ADMIN — Medication 1 SPRAY(S): at 09:02

## 2019-01-20 RX ADMIN — Medication 400 MILLIGRAM(S): at 20:19

## 2019-01-20 RX ADMIN — Medication 400 MILLIGRAM(S): at 14:00

## 2019-01-20 RX ADMIN — Medication 400 MILLIGRAM(S): at 19:19

## 2019-01-20 NOTE — PROGRESS NOTE BEHAVIORAL HEALTH - NSBHFUPINTERVALHXFT_PSY_A_CORE
patient reports that he still has dizziness and blurry vision which started after the naltrexone; he does not think it is the other medications or a synergistic effect as he has been on those agents before. patient would like to try the next lowest dose of the naltrexone which at this time would be discontinuation as he was already on half a tab. unable to cut it further down.

## 2019-01-21 PROCEDURE — 99233 SBSQ HOSP IP/OBS HIGH 50: CPT

## 2019-01-21 RX ADMIN — ATORVASTATIN CALCIUM 20 MILLIGRAM(S): 80 TABLET, FILM COATED ORAL at 20:58

## 2019-01-21 RX ADMIN — Medication 400 MILLIGRAM(S): at 21:50

## 2019-01-21 RX ADMIN — Medication 25 MILLIGRAM(S): at 13:00

## 2019-01-21 RX ADMIN — Medication 1 MILLIGRAM(S): at 08:32

## 2019-01-21 RX ADMIN — Medication 25 MILLIGRAM(S): at 08:32

## 2019-01-21 RX ADMIN — Medication 100 MILLIGRAM(S): at 20:59

## 2019-01-21 RX ADMIN — Medication 400 MILLIGRAM(S): at 20:57

## 2019-01-21 RX ADMIN — Medication 81 MILLIGRAM(S): at 08:31

## 2019-01-21 RX ADMIN — Medication 2 GRAM(S): at 20:57

## 2019-01-21 RX ADMIN — SERTRALINE 75 MILLIGRAM(S): 25 TABLET, FILM COATED ORAL at 08:32

## 2019-01-21 RX ADMIN — Medication 2 GRAM(S): at 08:32

## 2019-01-21 RX ADMIN — LOSARTAN POTASSIUM 100 MILLIGRAM(S): 100 TABLET, FILM COATED ORAL at 08:32

## 2019-01-21 RX ADMIN — Medication 1 TABLET(S): at 08:32

## 2019-01-21 RX ADMIN — Medication 1 SPRAY(S): at 08:31

## 2019-01-21 RX ADMIN — Medication 1 SPRAY(S): at 20:58

## 2019-01-21 NOTE — PROGRESS NOTE BEHAVIORAL HEALTH - NSBHFUPINTERVALHXFT_PSY_A_CORE
Patient reports less dizziness this morning and improved blurry vision after the naltrexone was discontinued. He does not want to resume it. He reports he does feel better and denies any other adverse side effects. he was in the rec room on the computer and engaged with peers and socialized.

## 2019-01-22 DIAGNOSIS — F10.14 ALCOHOL ABUSE WITH ALCOHOL-INDUCED MOOD DISORDER: ICD-10-CM

## 2019-01-22 PROCEDURE — 99233 SBSQ HOSP IP/OBS HIGH 50: CPT

## 2019-01-22 RX ADMIN — ATORVASTATIN CALCIUM 20 MILLIGRAM(S): 80 TABLET, FILM COATED ORAL at 21:15

## 2019-01-22 RX ADMIN — Medication 1 MILLIGRAM(S): at 08:34

## 2019-01-22 RX ADMIN — Medication 400 MILLIGRAM(S): at 13:50

## 2019-01-22 RX ADMIN — Medication 100 MILLIGRAM(S): at 21:19

## 2019-01-22 RX ADMIN — Medication 2 GRAM(S): at 08:34

## 2019-01-22 RX ADMIN — Medication 2 GRAM(S): at 21:15

## 2019-01-22 RX ADMIN — Medication 25 MILLIGRAM(S): at 12:59

## 2019-01-22 RX ADMIN — SERTRALINE 75 MILLIGRAM(S): 25 TABLET, FILM COATED ORAL at 08:34

## 2019-01-22 RX ADMIN — Medication 400 MILLIGRAM(S): at 21:19

## 2019-01-22 RX ADMIN — Medication 25 MILLIGRAM(S): at 21:15

## 2019-01-22 RX ADMIN — Medication 400 MILLIGRAM(S): at 22:10

## 2019-01-22 RX ADMIN — Medication 1 SPRAY(S): at 08:34

## 2019-01-22 RX ADMIN — Medication 1 TABLET(S): at 08:34

## 2019-01-22 RX ADMIN — Medication 81 MILLIGRAM(S): at 08:34

## 2019-01-22 RX ADMIN — Medication 400 MILLIGRAM(S): at 12:59
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
## 2019-01-22 NOTE — PROGRESS NOTE BEHAVIORAL HEALTH - NSBHADMITMEDEDUDETAILS_A_CORE FT
discontinue naltrexone due to continued side effects discontinued naltrexone on 1/21/19 due to continued side effects

## 2019-01-23 PROCEDURE — 99233 SBSQ HOSP IP/OBS HIGH 50: CPT

## 2019-01-23 RX ADMIN — Medication 2 GRAM(S): at 08:45

## 2019-01-23 RX ADMIN — Medication 25 MILLIGRAM(S): at 21:25

## 2019-01-23 RX ADMIN — Medication 1 TABLET(S): at 08:45

## 2019-01-23 RX ADMIN — SERTRALINE 75 MILLIGRAM(S): 25 TABLET, FILM COATED ORAL at 08:45

## 2019-01-23 RX ADMIN — Medication 100 MILLIGRAM(S): at 21:25

## 2019-01-23 RX ADMIN — Medication 2 GRAM(S): at 21:25

## 2019-01-23 RX ADMIN — Medication 1 MILLIGRAM(S): at 08:45

## 2019-01-23 RX ADMIN — Medication 400 MILLIGRAM(S): at 22:27

## 2019-01-23 RX ADMIN — Medication 81 MILLIGRAM(S): at 08:45

## 2019-01-23 RX ADMIN — ATORVASTATIN CALCIUM 20 MILLIGRAM(S): 80 TABLET, FILM COATED ORAL at 21:25

## 2019-01-23 RX ADMIN — Medication 400 MILLIGRAM(S): at 21:27
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
## 2019-01-23 NOTE — PROGRESS NOTE BEHAVIORAL HEALTH - NSBHFUPINTERVALHXFT_PSY_A_CORE
Feeling better - no more dizziness and no more blurry vision. He reports he does feel better and denies any other adverse side effects. He continues to be visible on the Unit, socializes with peers, attends groups and participates. Looking forward to his mother coming to visit today and meet with Treatment Team. Motivated to maintain his sobriety at this time. reports that his current girlfriend is a big support for him and she is understanding of mental health issues as she is a .

## 2019-01-24 PROCEDURE — 99233 SBSQ HOSP IP/OBS HIGH 50: CPT

## 2019-01-24 RX ORDER — HYDRALAZINE HCL 50 MG
25 TABLET ORAL
Qty: 0 | Refills: 0 | Status: DISCONTINUED | OUTPATIENT
Start: 2019-01-24 | End: 2019-01-25

## 2019-01-24 RX ADMIN — SERTRALINE 75 MILLIGRAM(S): 25 TABLET, FILM COATED ORAL at 08:37

## 2019-01-24 RX ADMIN — Medication 25 MILLIGRAM(S): at 21:11

## 2019-01-24 RX ADMIN — ATORVASTATIN CALCIUM 20 MILLIGRAM(S): 80 TABLET, FILM COATED ORAL at 21:11

## 2019-01-24 RX ADMIN — Medication 2 GRAM(S): at 21:11

## 2019-01-24 RX ADMIN — Medication 2 GRAM(S): at 08:37

## 2019-01-24 RX ADMIN — Medication 100 MILLIGRAM(S): at 21:13

## 2019-01-24 RX ADMIN — Medication 81 MILLIGRAM(S): at 08:37

## 2019-01-24 RX ADMIN — Medication 25 MILLIGRAM(S): at 08:37

## 2019-01-24 RX ADMIN — Medication 1 TABLET(S): at 08:37

## 2019-01-24 RX ADMIN — Medication 1 MILLIGRAM(S): at 08:37

## 2019-01-24 RX ADMIN — LOSARTAN POTASSIUM 100 MILLIGRAM(S): 100 TABLET, FILM COATED ORAL at 08:37

## 2019-01-24 NOTE — PROGRESS NOTE BEHAVIORAL HEALTH - NSBHFUPINTERVALHXFT_PSY_A_CORE
Patient's team meeting with his mother went well yesterday - she took his update well, provided reassurance and support. Patient still cannot return to live with her at this time. Overall, the meeting showed Patient and mother share affection with each other and she is a big support for him. Patient c/o of some low BP/lightheadedness and asked if his Hydralazine can be given less than 3x/day - changed to BID with plan to monitor BP and make subsequent adjustment as indicated. Otherwise, Patient reports he does feel better and denies any other adverse side effects. He continues to be visible on the Unit, socializes with peers, attends groups and participates. Motivated to maintain his sobriety at this time. reports that his current girlfriend is a big support for him and she is understanding of mental health issues as she is a .

## 2019-01-24 NOTE — PROGRESS NOTE BEHAVIORAL HEALTH - NSBHADMITMEDEDUDETAILS_A_CORE FT
change Hydralazine 25mg PO TID to BID as Pt feels lightheaded on this and has had some lower BP reads. Plan to monitor BP and make subsequent adjustment as indicated

## 2019-01-25 PROCEDURE — 99233 SBSQ HOSP IP/OBS HIGH 50: CPT

## 2019-01-25 RX ORDER — HYDRALAZINE HCL 50 MG
25 TABLET ORAL
Qty: 0 | Refills: 0 | Status: DISCONTINUED | OUTPATIENT
Start: 2019-01-25 | End: 2019-01-26

## 2019-01-25 RX ADMIN — Medication 1 MILLIGRAM(S): at 09:17

## 2019-01-25 RX ADMIN — Medication 2 GRAM(S): at 09:17

## 2019-01-25 RX ADMIN — SERTRALINE 75 MILLIGRAM(S): 25 TABLET, FILM COATED ORAL at 09:17

## 2019-01-25 RX ADMIN — Medication 81 MILLIGRAM(S): at 09:17

## 2019-01-25 RX ADMIN — ATORVASTATIN CALCIUM 20 MILLIGRAM(S): 80 TABLET, FILM COATED ORAL at 21:13

## 2019-01-25 RX ADMIN — Medication 2 GRAM(S): at 21:13

## 2019-01-25 RX ADMIN — Medication 100 MILLIGRAM(S): at 21:14

## 2019-01-25 RX ADMIN — Medication 1 TABLET(S): at 09:17

## 2019-01-25 NOTE — PROGRESS NOTE BEHAVIORAL HEALTH - NSBHADMITMEDEDUDETAILS_A_CORE FT
change Hydralazine 25mg PO BID to qhs as Pt still feels lightheaded on this and has had some lower BP reads. Plan to monitor BP and make subsequent adjustment as indicated

## 2019-01-25 NOTE — PROGRESS NOTE BEHAVIORAL HEALTH - NSBHFUPINTERVALHXFT_PSY_A_CORE
Patient still c/o of some low BP/lightheadedness and asked if his Hydralazine can be further adjusted. - changed from from BID to qhs with plan to monitor NP and make subsequent adjustment as indicated. Same clinical presentation otherwise - Patient reports he does feel better and denies any other adverse side effects. He continues to be visible on the Unit, socializes with peers, attends groups and participates. Motivated to maintain his sobriety at this time. reports that his current girlfriend is a big support for him and she is understanding of mental health issues as she is a .

## 2019-01-26 PROCEDURE — 99232 SBSQ HOSP IP/OBS MODERATE 35: CPT

## 2019-01-26 RX ADMIN — Medication 2 GRAM(S): at 08:34

## 2019-01-26 RX ADMIN — Medication 81 MILLIGRAM(S): at 08:34

## 2019-01-26 RX ADMIN — Medication 1 TABLET(S): at 08:34

## 2019-01-26 RX ADMIN — Medication 100 MILLIGRAM(S): at 21:25

## 2019-01-26 RX ADMIN — ATORVASTATIN CALCIUM 20 MILLIGRAM(S): 80 TABLET, FILM COATED ORAL at 21:25

## 2019-01-26 RX ADMIN — Medication 1 MILLIGRAM(S): at 08:35

## 2019-01-26 RX ADMIN — SERTRALINE 75 MILLIGRAM(S): 25 TABLET, FILM COATED ORAL at 08:35

## 2019-01-26 RX ADMIN — Medication 2 GRAM(S): at 21:25

## 2019-01-26 NOTE — PROGRESS NOTE BEHAVIORAL HEALTH - NSBHADMITMEDEDUDETAILS_A_CORE FT
discontinue Hydralazine 25mg PO qhs as Pt still feels lightheaded on this and has had some lower BP reads. Continue to monitor BP. Still on Cozaar 100mhg PO qd

## 2019-01-26 NOTE — PROGRESS NOTE BEHAVIORAL HEALTH - NSBHFUPINTERVALHXFT_PSY_A_CORE
/63; Patient thinks he only needs one BP medication and Writer agrees. He is thus off of hydralazine as it needed to be held daily due to low BP reads and BP read started to improve as the hydralazine daily dose was reduced. Patient's insurance continues to be an issue as he cannot recall what he has etc. Same clinical presentation otherwise - Patient reports he does feel better and denies any other adverse side effects. He continues to be visible on the Unit, socializes with peers, attends groups and participates. Motivated to maintain his sobriety at this time. reports that his current girlfriend is a big support for him and she is understanding of mental health issues as she is a .

## 2019-01-26 NOTE — PROGRESS NOTE BEHAVIORAL HEALTH - DETAILS
see above
improving dizziness, blurry vision
improving dizziness, blurry vision
dizziness, blurry vision
improving dizziness, blurry vision
reported slight dizziness and blurred vision at times
see above
see above

## 2019-01-27 PROCEDURE — 99232 SBSQ HOSP IP/OBS MODERATE 35: CPT

## 2019-01-27 RX ADMIN — Medication 1 TABLET(S): at 08:48

## 2019-01-27 RX ADMIN — SERTRALINE 75 MILLIGRAM(S): 25 TABLET, FILM COATED ORAL at 08:48

## 2019-01-27 RX ADMIN — Medication 1 MILLIGRAM(S): at 08:48

## 2019-01-27 RX ADMIN — Medication 81 MILLIGRAM(S): at 08:48

## 2019-01-27 RX ADMIN — Medication 100 MILLIGRAM(S): at 20:29

## 2019-01-27 RX ADMIN — Medication 2 GRAM(S): at 08:48

## 2019-01-27 RX ADMIN — Medication 2 GRAM(S): at 20:29

## 2019-01-27 RX ADMIN — ATORVASTATIN CALCIUM 20 MILLIGRAM(S): 80 TABLET, FILM COATED ORAL at 20:29

## 2019-01-27 NOTE — PROGRESS NOTE BEHAVIORAL HEALTH - NSBHFUPINTERVALHXFT_PSY_A_CORE
BP steady with no subsequent drops; doing well on just on BP medication.  Patient reports he does feel better and denies any other adverse side effects. He continues to be visible on the Unit, socializes with peers, attends groups and participates. Motivated to maintain his sobriety at this time. Likes to spend time on the unit computer. Eye blurriness completely remitted once the naltrexone was discontinued

## 2019-01-28 PROCEDURE — 99232 SBSQ HOSP IP/OBS MODERATE 35: CPT

## 2019-01-28 RX ADMIN — ATORVASTATIN CALCIUM 20 MILLIGRAM(S): 80 TABLET, FILM COATED ORAL at 21:10

## 2019-01-28 RX ADMIN — Medication 100 MILLIGRAM(S): at 21:11

## 2019-01-28 RX ADMIN — Medication 2 GRAM(S): at 08:21

## 2019-01-28 RX ADMIN — Medication 81 MILLIGRAM(S): at 08:21

## 2019-01-28 RX ADMIN — Medication 1 TABLET(S): at 08:21

## 2019-01-28 RX ADMIN — Medication 2 GRAM(S): at 21:11

## 2019-01-28 RX ADMIN — SERTRALINE 75 MILLIGRAM(S): 25 TABLET, FILM COATED ORAL at 08:21

## 2019-01-28 RX ADMIN — Medication 1 MILLIGRAM(S): at 08:21

## 2019-01-28 NOTE — PROGRESS NOTE BEHAVIORAL HEALTH - NSBHADMITMEDEDUDETAILS_A_CORE FT
Psychoeducation provided re; need for Rx and aftercare and sobriety adherence for sx management and relapse prevention with teach back verbalilzes

## 2019-01-28 NOTE — PROGRESS NOTE BEHAVIORAL HEALTH - NSBHFUPINTERVALHXFT_PSY_A_CORE
Met with and evaluated patient.  Chart reviewed and case discussed in tx team meeting. No significant interval events are reported, except patient reports some depressive sx today.  He has been going to groups, and reading a book that he knows helps him feel more positive.  He continues motivated for sobriety, and willing to go to rehab.  No Rx SE are noted or reported.  Patient denies any SI or HI

## 2019-01-29 PROCEDURE — 99232 SBSQ HOSP IP/OBS MODERATE 35: CPT

## 2019-01-29 RX ORDER — SERTRALINE 25 MG/1
100 TABLET, FILM COATED ORAL DAILY
Qty: 0 | Refills: 0 | Status: DISCONTINUED | OUTPATIENT
Start: 2019-01-30 | End: 2019-02-18

## 2019-01-29 RX ADMIN — Medication 1 MILLIGRAM(S): at 08:53

## 2019-01-29 RX ADMIN — SERTRALINE 75 MILLIGRAM(S): 25 TABLET, FILM COATED ORAL at 08:54

## 2019-01-29 RX ADMIN — Medication 1 TABLET(S): at 08:53

## 2019-01-29 RX ADMIN — Medication 2 GRAM(S): at 08:54

## 2019-01-29 RX ADMIN — ATORVASTATIN CALCIUM 20 MILLIGRAM(S): 80 TABLET, FILM COATED ORAL at 21:09

## 2019-01-29 RX ADMIN — Medication 81 MILLIGRAM(S): at 08:53

## 2019-01-29 RX ADMIN — Medication 2 GRAM(S): at 21:09

## 2019-01-29 RX ADMIN — Medication 100 MILLIGRAM(S): at 21:09

## 2019-01-29 NOTE — PROGRESS NOTE BEHAVIORAL HEALTH - NSBHFUPSUICINTERVALFT_PSY_A_CORE
denies active SI or plan
Reports he sometimes thinks he would be better off if he wasn't here, but has no active SI or plan, and would never harm himself because of his children.  Reports he will tell staff if he has any SI

## 2019-01-29 NOTE — PROGRESS NOTE BEHAVIORAL HEALTH - NSBHFUPINTERVALHXFT_PSY_A_CORE
Met with and evaluated patient.  Chart reviewed and case discussed in tx team meeting. No significant interval events are reported, except patient continues to report  some depressive sx today.  He continues to go to groups, and read a book that he knows helps him feel more positive.  He continues motivated for sobriety, and willing to go to rehab. He reports he feels he would be safe in rehab.   No Rx SE are noted or reported.  Patient denies any SI or HI, but does report he sometimes thinks maybe he would be better off if he wasn't here, but adamantly denies any active SI or plan  "I would never hurt myself because of my children" Met with and evaluated patient.  Chart reviewed and case discussed in tx team meeting. No significant interval events are reported, except patient continues to report  some depressive sx today.  He continues to go to groups, and read a book that he knows helps him feel more positive.  He continues motivated for sobriety, and willing to go to rehab. He reports he feels he would be safe in rehab.   No Rx SE are noted or reported.  Patient denies any SI or HI, but does report he sometimes thinks maybe he would be better off if he wasn't here, but adamantly denies any active SI or plan  "I would never hurt myself because of my children"  Zoloft increased to 100mg q am.

## 2019-01-29 NOTE — PROGRESS NOTE BEHAVIORAL HEALTH - NSBHADMITMEDEDUDETAILS_A_CORE FT
Psychoeducation continues re; need for Rx and aftercare and sobriety adherence for sx management and relapse prevention with teach back verbalized Psychoeducation continues re; increase in Zoloft dose,  need for Rx and aftercare and sobriety adherence for sx management and relapse prevention with teach back verbalized

## 2019-01-30 PROCEDURE — 99232 SBSQ HOSP IP/OBS MODERATE 35: CPT

## 2019-01-30 RX ADMIN — Medication 1 MILLIGRAM(S): at 08:32

## 2019-01-30 RX ADMIN — SERTRALINE 100 MILLIGRAM(S): 25 TABLET, FILM COATED ORAL at 08:32

## 2019-01-30 RX ADMIN — Medication 100 MILLIGRAM(S): at 21:21

## 2019-01-30 RX ADMIN — Medication 2 GRAM(S): at 08:32

## 2019-01-30 RX ADMIN — Medication 81 MILLIGRAM(S): at 08:32

## 2019-01-30 RX ADMIN — Medication 2 GRAM(S): at 21:21

## 2019-01-30 RX ADMIN — Medication 400 MILLIGRAM(S): at 21:59

## 2019-01-30 RX ADMIN — Medication 400 MILLIGRAM(S): at 21:21

## 2019-01-30 RX ADMIN — Medication 1 TABLET(S): at 08:32

## 2019-01-30 RX ADMIN — ATORVASTATIN CALCIUM 20 MILLIGRAM(S): 80 TABLET, FILM COATED ORAL at 21:21

## 2019-01-30 NOTE — PROGRESS NOTE BEHAVIORAL HEALTH - NSBHADMITMEDEDUDETAILS_A_CORE FT
Psychoeducation continues re;  need for Rx and aftercare and sobriety adherence for sx management and relapse prevention with teach back verbalized

## 2019-01-30 NOTE — PROGRESS NOTE BEHAVIORAL HEALTH - NSBHFUPINTERVALHXFT_PSY_A_CORE
Met with and evaluated patient.  Chart reviewed and case discussed in tx team meeting. No significant interval events are reported, except patient reports improvement in depressive sx today.  He also reports he is less anxious.  He continues to go to groups, and read a book that he knows helps him feel more positive.  He continues motivated for sobriety, and willing to go to rehab. He is awaiting medicaid..   No Rx SE are noted or reported.  Patient denies any SI or HI.  tolerating increase in Zoloft well.

## 2019-01-31 PROCEDURE — 99232 SBSQ HOSP IP/OBS MODERATE 35: CPT

## 2019-01-31 RX ORDER — HYDRALAZINE HCL 50 MG
0 TABLET ORAL
Qty: 0 | Refills: 0 | COMMUNITY

## 2019-01-31 RX ADMIN — Medication 81 MILLIGRAM(S): at 09:03

## 2019-01-31 RX ADMIN — Medication 400 MILLIGRAM(S): at 12:00

## 2019-01-31 RX ADMIN — Medication 2 GRAM(S): at 20:21

## 2019-01-31 RX ADMIN — Medication 1 TABLET(S): at 09:03

## 2019-01-31 RX ADMIN — Medication 1 MILLIGRAM(S): at 09:03

## 2019-01-31 RX ADMIN — Medication 2 GRAM(S): at 09:03

## 2019-01-31 RX ADMIN — Medication 400 MILLIGRAM(S): at 12:55

## 2019-01-31 RX ADMIN — ATORVASTATIN CALCIUM 20 MILLIGRAM(S): 80 TABLET, FILM COATED ORAL at 20:21

## 2019-01-31 RX ADMIN — SERTRALINE 100 MILLIGRAM(S): 25 TABLET, FILM COATED ORAL at 09:03

## 2019-01-31 RX ADMIN — Medication 100 MILLIGRAM(S): at 20:20

## 2019-01-31 NOTE — PROGRESS NOTE BEHAVIORAL HEALTH - NSBHADMITMEDEDUDETAILS_A_CORE FT
Psychoeducation continues re;  need for Rx and aftercare and sobriety adherence for sx management and relapse prevention with teach back verbalized.  Also teaching done re: benefits of AA with teach back verbalized

## 2019-01-31 NOTE — PROGRESS NOTE BEHAVIORAL HEALTH - NSBHFUPINTERVALHXFT_PSY_A_CORE
Met with and evaluated patient.  Chart reviewed and case discussed in tx team meeting. No significant interval events are reported.  Patient reports much less anxiety and improvement .in depressive sx . He reports feeling positive about how he handled a family situation and is able to describe his assertive behavior. .  He continues to go to groups, and to AA on the unit which he feels is helping him a lot. He continues motivated for sobriety, and willing to go to rehab. He is awaiting medicaid..   No Rx SE are noted or reported.  Patient denies any SI or HI.

## 2019-02-01 PROCEDURE — 99232 SBSQ HOSP IP/OBS MODERATE 35: CPT

## 2019-02-01 RX ADMIN — Medication 1 TABLET(S): at 08:37

## 2019-02-01 RX ADMIN — Medication 100 MILLIGRAM(S): at 21:20

## 2019-02-01 RX ADMIN — Medication 81 MILLIGRAM(S): at 08:37

## 2019-02-01 RX ADMIN — Medication 1 MILLIGRAM(S): at 08:37

## 2019-02-01 RX ADMIN — SERTRALINE 100 MILLIGRAM(S): 25 TABLET, FILM COATED ORAL at 08:37

## 2019-02-01 RX ADMIN — Medication 25 MILLIGRAM(S): at 15:44

## 2019-02-01 RX ADMIN — ATORVASTATIN CALCIUM 20 MILLIGRAM(S): 80 TABLET, FILM COATED ORAL at 21:19

## 2019-02-01 RX ADMIN — LOSARTAN POTASSIUM 100 MILLIGRAM(S): 100 TABLET, FILM COATED ORAL at 08:37

## 2019-02-01 RX ADMIN — Medication 25 MILLIGRAM(S): at 21:20

## 2019-02-01 RX ADMIN — Medication 2 GRAM(S): at 08:37

## 2019-02-01 RX ADMIN — Medication 2 GRAM(S): at 21:19

## 2019-02-01 NOTE — PROGRESS NOTE BEHAVIORAL HEALTH - NSBHFUPINTERVALHXFT_PSY_A_CORE
Met with and evaluated patient.  Chart reviewed and case discussed in tx team meeting. No significant interval events are reported.  Patient reports much less anxiety and no depression. He is tolerating the increased Zoloft well with no c/o sedation.  He reports he is sleeping well with the Trazadone, and that his appetite is good.. .  He continues to go to groups, and to AA on the unit which he feels is helping him a lot. He continues motivated for sobriety, and willing to go to rehab. He is awaiting medicaid..   No Rx SE are noted or reported.  Patient denies any SI or HI.

## 2019-02-02 RX ADMIN — Medication 1 TABLET(S): at 08:52

## 2019-02-02 RX ADMIN — Medication 100 MILLIGRAM(S): at 21:11

## 2019-02-02 RX ADMIN — Medication 2 GRAM(S): at 20:58

## 2019-02-02 RX ADMIN — Medication 1 MILLIGRAM(S): at 08:52

## 2019-02-02 RX ADMIN — SERTRALINE 100 MILLIGRAM(S): 25 TABLET, FILM COATED ORAL at 08:52

## 2019-02-02 RX ADMIN — ATORVASTATIN CALCIUM 20 MILLIGRAM(S): 80 TABLET, FILM COATED ORAL at 20:58

## 2019-02-02 RX ADMIN — Medication 81 MILLIGRAM(S): at 08:52

## 2019-02-02 RX ADMIN — Medication 400 MILLIGRAM(S): at 21:12

## 2019-02-02 RX ADMIN — Medication 2 GRAM(S): at 08:52

## 2019-02-02 RX ADMIN — Medication 400 MILLIGRAM(S): at 16:26

## 2019-02-03 RX ADMIN — Medication 81 MILLIGRAM(S): at 08:41

## 2019-02-03 RX ADMIN — Medication 1 MILLIGRAM(S): at 08:41

## 2019-02-03 RX ADMIN — Medication 25 MILLIGRAM(S): at 16:08

## 2019-02-03 RX ADMIN — Medication 2 GRAM(S): at 21:07

## 2019-02-03 RX ADMIN — SERTRALINE 100 MILLIGRAM(S): 25 TABLET, FILM COATED ORAL at 08:41

## 2019-02-03 RX ADMIN — Medication 1 TABLET(S): at 08:41

## 2019-02-03 RX ADMIN — ATORVASTATIN CALCIUM 20 MILLIGRAM(S): 80 TABLET, FILM COATED ORAL at 21:08

## 2019-02-03 RX ADMIN — Medication 100 MILLIGRAM(S): at 21:09

## 2019-02-03 RX ADMIN — Medication 2 GRAM(S): at 08:41

## 2019-02-04 PROCEDURE — 99232 SBSQ HOSP IP/OBS MODERATE 35: CPT

## 2019-02-04 RX ADMIN — Medication 1 MILLIGRAM(S): at 08:17

## 2019-02-04 RX ADMIN — Medication 81 MILLIGRAM(S): at 08:16

## 2019-02-04 RX ADMIN — SERTRALINE 100 MILLIGRAM(S): 25 TABLET, FILM COATED ORAL at 08:16

## 2019-02-04 RX ADMIN — ATORVASTATIN CALCIUM 20 MILLIGRAM(S): 80 TABLET, FILM COATED ORAL at 21:14

## 2019-02-04 RX ADMIN — Medication 2 GRAM(S): at 21:14

## 2019-02-04 RX ADMIN — Medication 2 GRAM(S): at 08:16

## 2019-02-04 RX ADMIN — Medication 100 MILLIGRAM(S): at 21:15

## 2019-02-04 RX ADMIN — Medication 25 MILLIGRAM(S): at 19:25

## 2019-02-04 RX ADMIN — Medication 1 TABLET(S): at 08:16

## 2019-02-04 NOTE — PROGRESS NOTE BEHAVIORAL HEALTH - NSBHFUPINTERVALHXFT_PSY_A_CORE
No significant interval events over the weekend. patient tolerating the Zoloft at 100mg PO qd and denies adverse medication side effects. Patient reports he does feel better and denies any other adverse side effects. He continues to be visible on the Unit, socializes with peers, attends groups and participates. Motivated to maintain his sobriety at this time. Likes to spend time on the unit computer. Eye blurriness completely remitted once the naltrexone was discontinued

## 2019-02-05 PROCEDURE — 99232 SBSQ HOSP IP/OBS MODERATE 35: CPT

## 2019-02-05 RX ADMIN — Medication 25 MILLIGRAM(S): at 15:44

## 2019-02-05 RX ADMIN — ATORVASTATIN CALCIUM 20 MILLIGRAM(S): 80 TABLET, FILM COATED ORAL at 21:25

## 2019-02-05 RX ADMIN — Medication 2 GRAM(S): at 21:25

## 2019-02-05 RX ADMIN — Medication 2 GRAM(S): at 08:20

## 2019-02-05 RX ADMIN — LOSARTAN POTASSIUM 100 MILLIGRAM(S): 100 TABLET, FILM COATED ORAL at 08:20

## 2019-02-05 RX ADMIN — SERTRALINE 100 MILLIGRAM(S): 25 TABLET, FILM COATED ORAL at 08:20

## 2019-02-05 RX ADMIN — Medication 1 TABLET(S): at 08:20

## 2019-02-05 RX ADMIN — Medication 1 MILLIGRAM(S): at 08:20

## 2019-02-05 RX ADMIN — Medication 100 MILLIGRAM(S): at 21:24

## 2019-02-05 RX ADMIN — Medication 81 MILLIGRAM(S): at 08:20

## 2019-02-05 NOTE — PROGRESS NOTE BEHAVIORAL HEALTH - NSBHFUPINTERVALHXFT_PSY_A_CORE
Significant interval events: Patient for approved for Medicaid so active discharge planning in progress. Same clinical presentation - he is tolerating the Zoloft at 100mg PO qd and denies adverse medication side effects. Patient reports he does feel better and denies any other adverse side effects. He continues to be visible on the Unit, socializes with peers, attends groups and participates. Motivated to maintain his sobriety at this time. Likes to spend time on the unit computer.

## 2019-02-06 PROCEDURE — 99232 SBSQ HOSP IP/OBS MODERATE 35: CPT

## 2019-02-06 RX ORDER — FLUTICASONE PROPIONATE 50 MCG
1 SPRAY, SUSPENSION NASAL
Qty: 0 | Refills: 0 | Status: DISCONTINUED | OUTPATIENT
Start: 2019-02-06 | End: 2019-02-18

## 2019-02-06 RX ADMIN — Medication 1 SPRAY(S): at 21:35

## 2019-02-06 RX ADMIN — SERTRALINE 100 MILLIGRAM(S): 25 TABLET, FILM COATED ORAL at 08:56

## 2019-02-06 RX ADMIN — ATORVASTATIN CALCIUM 20 MILLIGRAM(S): 80 TABLET, FILM COATED ORAL at 21:35

## 2019-02-06 RX ADMIN — Medication 400 MILLIGRAM(S): at 15:47

## 2019-02-06 RX ADMIN — Medication 81 MILLIGRAM(S): at 08:56

## 2019-02-06 RX ADMIN — Medication 100 MILLIGRAM(S): at 21:35

## 2019-02-06 RX ADMIN — QUETIAPINE FUMARATE 50 MILLIGRAM(S): 200 TABLET, FILM COATED ORAL at 21:34

## 2019-02-06 RX ADMIN — Medication 2 GRAM(S): at 08:56

## 2019-02-06 RX ADMIN — Medication 1 TABLET(S): at 08:56

## 2019-02-06 RX ADMIN — Medication 400 MILLIGRAM(S): at 14:11

## 2019-02-06 RX ADMIN — Medication 1 MILLIGRAM(S): at 08:56

## 2019-02-06 RX ADMIN — Medication 25 MILLIGRAM(S): at 15:31

## 2019-02-06 RX ADMIN — Medication 2 GRAM(S): at 21:35

## 2019-02-06 NOTE — PROGRESS NOTE BEHAVIORAL HEALTH - NSBHADMITMEDEDUDETAILS_A_CORE FT
continue current medication management; active discharge planning in progress; ordered Flonase for patient as per his request

## 2019-02-06 NOTE — PROGRESS NOTE BEHAVIORAL HEALTH - NSBHFUPINTERVALHXFT_PSY_A_CORE
Significant interval events: Patient reports nasal congestion again and asked for Flonase back which was ordered for him. Active discharge planning underway. Patient is motivated to continue to maintain his progress and maintain his sobriety. Same clinical presentation - he is tolerating the Zoloft at 100mg PO qd and denies adverse medication side effects. Patient reports he does feel better and denies any other adverse side effects. He continues to be visible on the Unit, socializes with peers, attends groups and participates.

## 2019-02-07 PROCEDURE — 99232 SBSQ HOSP IP/OBS MODERATE 35: CPT

## 2019-02-07 RX ADMIN — Medication 1 SPRAY(S): at 21:28

## 2019-02-07 RX ADMIN — Medication 2 GRAM(S): at 08:38

## 2019-02-07 RX ADMIN — Medication 81 MILLIGRAM(S): at 08:38

## 2019-02-07 RX ADMIN — Medication 25 MILLIGRAM(S): at 16:10

## 2019-02-07 RX ADMIN — Medication 1 TABLET(S): at 08:38

## 2019-02-07 RX ADMIN — Medication 1 SPRAY(S): at 08:38

## 2019-02-07 RX ADMIN — SERTRALINE 100 MILLIGRAM(S): 25 TABLET, FILM COATED ORAL at 08:38

## 2019-02-07 RX ADMIN — Medication 1 MILLIGRAM(S): at 08:38

## 2019-02-07 RX ADMIN — ATORVASTATIN CALCIUM 20 MILLIGRAM(S): 80 TABLET, FILM COATED ORAL at 21:28

## 2019-02-07 RX ADMIN — Medication 2 GRAM(S): at 21:28
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
## 2019-02-07 NOTE — PROGRESS NOTE BEHAVIORAL HEALTH - NSBHFUPINTERVALHXFT_PSY_A_CORE
No significant interval events; active discharge planning underway. Patient is motivated to continue to maintain his progress and maintain his sobriety. Same clinical presentation - he is tolerating the Zoloft at 100mg PO qd and denies adverse medication side effects. Patient reports he does feel better and denies any other adverse side effects. He continues to be visible on the Unit, socializes with peers, attends groups and participates. Denies suicidal / homicidal ideation, intent or plan. Names protective factors (mother/family, girlfriend, he likes his job as a ; wants to get better, hopeful for future). Denies acces to guns or weapons on the outside and denies using illicit substances (only alcohol). Significant interval events; active discharge planning underway. Patient is visibly more anxious today, pale, more constricted affect, and reports feeling more anxious - he took 2 PRNs yesterday and said his mind went "blank" for a few minutes last evening as he felt overwhelmed. Patient is overwhelmed with potential discharge tomorrow as he has no where to go at this time (cannot stay with his mom this time girlfriend lives with her parents so that is not an option; he cannot stay with friends as they all drink and Pt fears relapse; he has been looking for places to rent every day - patient has indeed been on the computer daily looking for options - and nothing yet.) He was hoping for inpatient rehab to get acclimated to the return to everyday life but understands that it was not available due to insurance limitations. Patient said if he could have a few more days until he can find a room to go to, it would greatly help and allow him to work through his anxiety. He continues to be motivated to continue to maintain his progress and maintain his sobriety. He is also scared that he will have return of suicidal thoughts if he leaves the hospital and had nowhere to go.

## 2019-02-08 PROCEDURE — 99232 SBSQ HOSP IP/OBS MODERATE 35: CPT

## 2019-02-08 RX ORDER — DIPHENHYDRAMINE HCL 50 MG
25 CAPSULE ORAL EVERY 6 HOURS
Qty: 0 | Refills: 0 | Status: DISCONTINUED | OUTPATIENT
Start: 2019-02-08 | End: 2019-02-18

## 2019-02-08 RX ADMIN — Medication 2 GRAM(S): at 21:06

## 2019-02-08 RX ADMIN — QUETIAPINE FUMARATE 50 MILLIGRAM(S): 200 TABLET, FILM COATED ORAL at 21:05

## 2019-02-08 RX ADMIN — Medication 1 MILLIGRAM(S): at 08:37

## 2019-02-08 RX ADMIN — Medication 25 MILLIGRAM(S): at 15:08

## 2019-02-08 RX ADMIN — Medication 81 MILLIGRAM(S): at 08:37

## 2019-02-08 RX ADMIN — ATORVASTATIN CALCIUM 20 MILLIGRAM(S): 80 TABLET, FILM COATED ORAL at 21:06

## 2019-02-08 RX ADMIN — Medication 2 GRAM(S): at 08:37

## 2019-02-08 RX ADMIN — Medication 1 SPRAY(S): at 08:37

## 2019-02-08 RX ADMIN — SERTRALINE 100 MILLIGRAM(S): 25 TABLET, FILM COATED ORAL at 08:37

## 2019-02-08 RX ADMIN — Medication 25 MILLIGRAM(S): at 21:26

## 2019-02-08 RX ADMIN — Medication 1 TABLET(S): at 08:37

## 2019-02-08 RX ADMIN — Medication 1 SPRAY(S): at 21:06
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
## 2019-02-08 NOTE — PROGRESS NOTE BEHAVIORAL HEALTH - NSBHFUPINTERVALHXFT_PSY_A_CORE
Significant interval events - Patient reports feeling relief with less anxiety now that he has more days to find somewhere to live. Patient is less overwhelmed with potential discharge and more hopeful about it. He continues to be motivated to continue to maintain his progress and maintain his sobriety. He is also scared that he will have return of suicidal thoughts if he leaves the hospital and had nowhere to go. he has not needed any PRNs in  last 24 hours.

## 2019-02-09 RX ADMIN — SERTRALINE 100 MILLIGRAM(S): 25 TABLET, FILM COATED ORAL at 08:32

## 2019-02-09 RX ADMIN — Medication 1 TABLET(S): at 08:32

## 2019-02-09 RX ADMIN — Medication 1 SPRAY(S): at 08:32

## 2019-02-09 RX ADMIN — ATORVASTATIN CALCIUM 20 MILLIGRAM(S): 80 TABLET, FILM COATED ORAL at 20:05

## 2019-02-09 RX ADMIN — Medication 2 GRAM(S): at 08:32

## 2019-02-09 RX ADMIN — Medication 1 MILLIGRAM(S): at 08:32

## 2019-02-09 RX ADMIN — Medication 25 MILLIGRAM(S): at 12:43

## 2019-02-09 RX ADMIN — Medication 100 MILLIGRAM(S): at 20:06

## 2019-02-09 RX ADMIN — Medication 2 GRAM(S): at 20:06

## 2019-02-09 RX ADMIN — Medication 81 MILLIGRAM(S): at 08:32

## 2019-02-09 RX ADMIN — Medication 1 SPRAY(S): at 20:06

## 2019-02-10 RX ADMIN — Medication 2 GRAM(S): at 21:10

## 2019-02-10 RX ADMIN — ATORVASTATIN CALCIUM 20 MILLIGRAM(S): 80 TABLET, FILM COATED ORAL at 21:11

## 2019-02-10 RX ADMIN — Medication 2 GRAM(S): at 08:27

## 2019-02-10 RX ADMIN — SERTRALINE 100 MILLIGRAM(S): 25 TABLET, FILM COATED ORAL at 08:27

## 2019-02-10 RX ADMIN — Medication 100 MILLIGRAM(S): at 21:10

## 2019-02-10 RX ADMIN — Medication 81 MILLIGRAM(S): at 08:26

## 2019-02-10 RX ADMIN — Medication 1 SPRAY(S): at 08:27

## 2019-02-10 RX ADMIN — Medication 1 TABLET(S): at 08:27

## 2019-02-10 RX ADMIN — Medication 1 MILLIGRAM(S): at 08:27

## 2019-02-10 RX ADMIN — Medication 1 SPRAY(S): at 21:10

## 2019-02-11 PROCEDURE — 99232 SBSQ HOSP IP/OBS MODERATE 35: CPT

## 2019-02-11 RX ORDER — LOSARTAN POTASSIUM 100 MG/1
25 TABLET, FILM COATED ORAL DAILY
Qty: 0 | Refills: 0 | Status: DISCONTINUED | OUTPATIENT
Start: 2019-02-11 | End: 2019-02-14

## 2019-02-11 RX ADMIN — Medication 1 SPRAY(S): at 21:03

## 2019-02-11 RX ADMIN — SERTRALINE 100 MILLIGRAM(S): 25 TABLET, FILM COATED ORAL at 08:24

## 2019-02-11 RX ADMIN — Medication 1 SPRAY(S): at 08:27

## 2019-02-11 RX ADMIN — Medication 81 MILLIGRAM(S): at 08:24

## 2019-02-11 RX ADMIN — Medication 100 MILLIGRAM(S): at 21:03

## 2019-02-11 RX ADMIN — Medication 2 GRAM(S): at 08:26

## 2019-02-11 RX ADMIN — Medication 25 MILLIGRAM(S): at 15:46

## 2019-02-11 RX ADMIN — Medication 1 TABLET(S): at 08:26

## 2019-02-11 RX ADMIN — ATORVASTATIN CALCIUM 20 MILLIGRAM(S): 80 TABLET, FILM COATED ORAL at 21:03

## 2019-02-11 RX ADMIN — Medication 2 GRAM(S): at 21:03

## 2019-02-11 RX ADMIN — Medication 1 MILLIGRAM(S): at 08:24
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
## 2019-02-11 NOTE — PROGRESS NOTE BEHAVIORAL HEALTH - NSBHFUPINTERVALHXFT_PSY_A_CORE
Met with and evaluated patient. Chart reviewed and case discussed in tx team meeting.  No significant interval events are reported.  Patient continues to report motivation for sobriety, and verbalize that he feels he will benefit from a rehab.  SW continues to seek out rehabs for patient, as he now has medicaid. He reports he feels relieved about getting the medicaid.  He denies any SI or HI, and reports he has many reasons to live, including his children.  No Rx SE are noted or reported

## 2019-02-11 NOTE — PROGRESS NOTE BEHAVIORAL HEALTH - NSBHADMITMEDEDUDETAILS_A_CORE FT
Psychoeducation provided re: need for Rx and sobriety compliance for sx management with teach back verbalized.

## 2019-02-12 LAB
ALBUMIN SERPL ELPH-MCNC: 3.8 G/DL — SIGNIFICANT CHANGE UP (ref 3.3–5)
ALP SERPL-CCNC: 53 U/L — SIGNIFICANT CHANGE UP (ref 30–120)
ALT FLD-CCNC: 41 U/L DA — SIGNIFICANT CHANGE UP (ref 10–60)
ANION GAP SERPL CALC-SCNC: 6 MMOL/L — SIGNIFICANT CHANGE UP (ref 5–17)
AST SERPL-CCNC: 19 U/L — SIGNIFICANT CHANGE UP (ref 10–40)
BILIRUB SERPL-MCNC: 0.4 MG/DL — SIGNIFICANT CHANGE UP (ref 0.2–1.2)
BUN SERPL-MCNC: 18 MG/DL — SIGNIFICANT CHANGE UP (ref 7–23)
CALCIUM SERPL-MCNC: 9.2 MG/DL — SIGNIFICANT CHANGE UP (ref 8.4–10.5)
CHLORIDE SERPL-SCNC: 106 MMOL/L — SIGNIFICANT CHANGE UP (ref 96–108)
CO2 SERPL-SCNC: 30 MMOL/L — SIGNIFICANT CHANGE UP (ref 22–31)
CREAT SERPL-MCNC: 1.15 MG/DL — SIGNIFICANT CHANGE UP (ref 0.5–1.3)
GLUCOSE SERPL-MCNC: 102 MG/DL — HIGH (ref 70–99)
HCT VFR BLD CALC: 42.8 % — SIGNIFICANT CHANGE UP (ref 39–50)
HGB BLD-MCNC: 14.4 G/DL — SIGNIFICANT CHANGE UP (ref 13–17)
MCHC RBC-ENTMCNC: 30 PG — SIGNIFICANT CHANGE UP (ref 27–34)
MCHC RBC-ENTMCNC: 33.6 GM/DL — SIGNIFICANT CHANGE UP (ref 32–36)
MCV RBC AUTO: 89.2 FL — SIGNIFICANT CHANGE UP (ref 80–100)
NRBC # BLD: 0 /100 WBCS — SIGNIFICANT CHANGE UP (ref 0–0)
PLATELET # BLD AUTO: 209 K/UL — SIGNIFICANT CHANGE UP (ref 150–400)
POTASSIUM SERPL-MCNC: 4.3 MMOL/L — SIGNIFICANT CHANGE UP (ref 3.5–5.3)
POTASSIUM SERPL-SCNC: 4.3 MMOL/L — SIGNIFICANT CHANGE UP (ref 3.5–5.3)
PROT SERPL-MCNC: 7 G/DL — SIGNIFICANT CHANGE UP (ref 6–8.3)
RBC # BLD: 4.8 M/UL — SIGNIFICANT CHANGE UP (ref 4.2–5.8)
RBC # FLD: 12 % — SIGNIFICANT CHANGE UP (ref 10.3–14.5)
SODIUM SERPL-SCNC: 142 MMOL/L — SIGNIFICANT CHANGE UP (ref 135–145)
WBC # BLD: 3.15 K/UL — LOW (ref 3.8–10.5)
WBC # FLD AUTO: 3.15 K/UL — LOW (ref 3.8–10.5)

## 2019-02-12 PROCEDURE — 99233 SBSQ HOSP IP/OBS HIGH 50: CPT

## 2019-02-12 RX ADMIN — SERTRALINE 100 MILLIGRAM(S): 25 TABLET, FILM COATED ORAL at 08:30

## 2019-02-12 RX ADMIN — Medication 100 MILLIGRAM(S): at 21:10

## 2019-02-12 RX ADMIN — Medication 1 SPRAY(S): at 08:30

## 2019-02-12 RX ADMIN — Medication 2 GRAM(S): at 21:10

## 2019-02-12 RX ADMIN — Medication 81 MILLIGRAM(S): at 08:29

## 2019-02-12 RX ADMIN — Medication 1 SPRAY(S): at 21:10

## 2019-02-12 RX ADMIN — Medication 1 TABLET(S): at 08:30

## 2019-02-12 RX ADMIN — Medication 1 MILLIGRAM(S): at 08:30

## 2019-02-12 RX ADMIN — ATORVASTATIN CALCIUM 20 MILLIGRAM(S): 80 TABLET, FILM COATED ORAL at 21:10

## 2019-02-12 RX ADMIN — Medication 2 GRAM(S): at 08:30

## 2019-02-12 NOTE — PROGRESS NOTE BEHAVIORAL HEALTH - NSBHFUPINTERVALHXFT_PSY_A_CORE
Met with and evaluated patient. Chart reviewed and case discussed in tx team meeting.  No significant interval events are reported.  Patient continues to report motivation for sobriety, and verbalize that he feels he will benefit from a rehab, but also reports if rehab not available, he would be ok with going to a [facility]ber [facility] and an IOP. . CAMS assessment done again, and patient scores low risk for suicide.  He is able to ID reasons to live, his children, his life, his plans for the future. He denies any access to weapons, and reports if he had SI after discharge he would tell his girlfriend or professional staff.  He reports he would call the  [facility] or the Suicide Hotline, or call or go to the [facility] in [place] (he was give phone numbers, and address for the [facility] in [place]) or would go to the nearest ED.  He reports his suicide attempts happened when he was drinking, so he knows sobriety is essential to his health and well being.  No Rx SE are noted or reported
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
## 2019-02-12 NOTE — PROGRESS NOTE BEHAVIORAL HEALTH - NSBHADMITMEDEDUDETAILS_A_CORE FT
Psychoeducation again  provided re: need for Rx and sobriety compliance for sx management with teach back verbalized.

## 2019-02-13 LAB
BASOPHILS # BLD AUTO: 0 K/UL — SIGNIFICANT CHANGE UP (ref 0–0.2)
BASOPHILS NFR BLD AUTO: 0 % — SIGNIFICANT CHANGE UP (ref 0–2)
EOSINOPHIL # BLD AUTO: 0.15 K/UL — SIGNIFICANT CHANGE UP (ref 0–0.5)
EOSINOPHIL NFR BLD AUTO: 4.4 % — SIGNIFICANT CHANGE UP (ref 0–6)
HCT VFR BLD CALC: 42.9 % — SIGNIFICANT CHANGE UP (ref 39–50)
HGB BLD-MCNC: 14.5 G/DL — SIGNIFICANT CHANGE UP (ref 13–17)
IMM GRANULOCYTES NFR BLD AUTO: 0.3 % — SIGNIFICANT CHANGE UP (ref 0–1.5)
LYMPHOCYTES # BLD AUTO: 1.46 K/UL — SIGNIFICANT CHANGE UP (ref 1–3.3)
LYMPHOCYTES # BLD AUTO: 43.1 % — SIGNIFICANT CHANGE UP (ref 13–44)
MCHC RBC-ENTMCNC: 29.4 PG — SIGNIFICANT CHANGE UP (ref 27–34)
MCHC RBC-ENTMCNC: 33.8 GM/DL — SIGNIFICANT CHANGE UP (ref 32–36)
MCV RBC AUTO: 86.8 FL — SIGNIFICANT CHANGE UP (ref 80–100)
MONOCYTES # BLD AUTO: 0.47 K/UL — SIGNIFICANT CHANGE UP (ref 0–0.9)
MONOCYTES NFR BLD AUTO: 13.9 % — SIGNIFICANT CHANGE UP (ref 2–14)
NEUTROPHILS # BLD AUTO: 1.3 K/UL — LOW (ref 1.8–7.4)
NEUTROPHILS NFR BLD AUTO: 38.3 % — LOW (ref 43–77)
NRBC # BLD: 0 /100 WBCS — SIGNIFICANT CHANGE UP (ref 0–0)
PLATELET # BLD AUTO: 216 K/UL — SIGNIFICANT CHANGE UP (ref 150–400)
RBC # BLD: 4.94 M/UL — SIGNIFICANT CHANGE UP (ref 4.2–5.8)
RBC # FLD: 11.9 % — SIGNIFICANT CHANGE UP (ref 10.3–14.5)
WBC # BLD: 3.39 K/UL — LOW (ref 3.8–10.5)
WBC # FLD AUTO: 3.39 K/UL — LOW (ref 3.8–10.5)

## 2019-02-13 PROCEDURE — 99232 SBSQ HOSP IP/OBS MODERATE 35: CPT

## 2019-02-13 RX ORDER — LANOLIN ALCOHOL/MO/W.PET/CERES
3 CREAM (GRAM) TOPICAL AT BEDTIME
Qty: 0 | Refills: 0 | Status: DISCONTINUED | OUTPATIENT
Start: 2019-02-13 | End: 2019-02-18

## 2019-02-13 RX ADMIN — LOSARTAN POTASSIUM 25 MILLIGRAM(S): 100 TABLET, FILM COATED ORAL at 08:57

## 2019-02-13 RX ADMIN — Medication 1 MILLIGRAM(S): at 08:57

## 2019-02-13 RX ADMIN — Medication 3 MILLIGRAM(S): at 21:12

## 2019-02-13 RX ADMIN — Medication 2 GRAM(S): at 08:57

## 2019-02-13 RX ADMIN — Medication 1 SPRAY(S): at 08:57

## 2019-02-13 RX ADMIN — Medication 81 MILLIGRAM(S): at 08:57

## 2019-02-13 RX ADMIN — SERTRALINE 100 MILLIGRAM(S): 25 TABLET, FILM COATED ORAL at 08:57

## 2019-02-13 RX ADMIN — Medication 2 GRAM(S): at 21:12

## 2019-02-13 RX ADMIN — Medication 25 MILLIGRAM(S): at 14:34

## 2019-02-13 RX ADMIN — Medication 1 SPRAY(S): at 21:12

## 2019-02-13 RX ADMIN — ATORVASTATIN CALCIUM 20 MILLIGRAM(S): 80 TABLET, FILM COATED ORAL at 21:12

## 2019-02-13 RX ADMIN — Medication 1 TABLET(S): at 08:57

## 2019-02-13 NOTE — PROGRESS NOTE BEHAVIORAL HEALTH - NSBHFUPINTERVALHXFT_PSY_A_CORE
Met with and evaluated patient. Chart reviewed and case discussed in tx team meeting.  No significant interval events are reported.  Patient continues to report motivation for sobriety, and verbalize that he feels he will benefit from going to [place].  " It will put me on the right path to sobriety" continues to c/o poor sleep some nights, Melatonin 3mg hs ordered, as patient reports it has helped him in the past.   No Rx SE are noted or reported. Patient denies any SI or HI, and reports he is hopeful about his future and maintaining sobriety.

## 2019-02-13 NOTE — PROGRESS NOTE BEHAVIORAL HEALTH - NSBHADMITMEDEDUDETAILS_A_CORE FT
Psychoeducation again  provided re: need for Rx and sobriety compliance for sx management with teach back verbalized.  discussed benefits of Melatonin, and patient reports he has taken it in the past with good results and no SE

## 2019-02-14 ENCOUNTER — TRANSCRIPTION ENCOUNTER (OUTPATIENT)
Age: 44
End: 2019-02-14

## 2019-02-14 PROCEDURE — 99232 SBSQ HOSP IP/OBS MODERATE 35: CPT

## 2019-02-14 RX ORDER — OMEGA-3 ACID ETHYL ESTERS 1 G
2 CAPSULE ORAL
Qty: 120 | Refills: 0
Start: 2019-02-14 | End: 2019-03-15

## 2019-02-14 RX ORDER — FOLIC ACID 0.8 MG
1 TABLET ORAL
Qty: 0 | Refills: 0 | DISCHARGE
Start: 2019-02-14

## 2019-02-14 RX ORDER — FLUTICASONE PROPIONATE 50 MCG
1 SPRAY, SUSPENSION NASAL
Qty: 0 | Refills: 0 | DISCHARGE
Start: 2019-02-14

## 2019-02-14 RX ORDER — LANOLIN ALCOHOL/MO/W.PET/CERES
1 CREAM (GRAM) TOPICAL
Qty: 0 | Refills: 0 | DISCHARGE
Start: 2019-02-14

## 2019-02-14 RX ORDER — LOSARTAN POTASSIUM 100 MG/1
1 TABLET, FILM COATED ORAL
Qty: 0 | Refills: 0 | COMMUNITY

## 2019-02-14 RX ORDER — ASPIRIN/CALCIUM CARB/MAGNESIUM 324 MG
1 TABLET ORAL
Qty: 30 | Refills: 0
Start: 2019-02-14 | End: 2019-03-15

## 2019-02-14 RX ORDER — TRAZODONE HCL 50 MG
1 TABLET ORAL
Qty: 30 | Refills: 0
Start: 2019-02-14 | End: 2019-03-15

## 2019-02-14 RX ORDER — SERTRALINE 25 MG/1
0 TABLET, FILM COATED ORAL
Qty: 0 | Refills: 0 | COMMUNITY

## 2019-02-14 RX ORDER — ATORVASTATIN CALCIUM 80 MG/1
1 TABLET, FILM COATED ORAL
Qty: 30 | Refills: 0
Start: 2019-02-14 | End: 2019-03-15

## 2019-02-14 RX ORDER — SERTRALINE 25 MG/1
1 TABLET, FILM COATED ORAL
Qty: 30 | Refills: 0
Start: 2019-02-14 | End: 2019-03-15

## 2019-02-14 RX ADMIN — Medication 1 TABLET(S): at 08:43

## 2019-02-14 RX ADMIN — Medication 1 SPRAY(S): at 08:43

## 2019-02-14 RX ADMIN — Medication 1 MILLIGRAM(S): at 08:43

## 2019-02-14 RX ADMIN — Medication 81 MILLIGRAM(S): at 08:43

## 2019-02-14 RX ADMIN — SERTRALINE 100 MILLIGRAM(S): 25 TABLET, FILM COATED ORAL at 08:43

## 2019-02-14 RX ADMIN — ATORVASTATIN CALCIUM 20 MILLIGRAM(S): 80 TABLET, FILM COATED ORAL at 21:19

## 2019-02-14 RX ADMIN — Medication 100 MILLIGRAM(S): at 21:19

## 2019-02-14 RX ADMIN — Medication 3 MILLIGRAM(S): at 21:19

## 2019-02-14 RX ADMIN — Medication 1 GRAM(S): at 21:20

## 2019-02-14 RX ADMIN — Medication 2 GRAM(S): at 08:43

## 2019-02-14 NOTE — DISCHARGE NOTE ADULT - PLAN OF CARE
No SI and improved, stable mood Patient reports improved stable mood, hopeful about his future, and denies any SI maintain sobriety Patient will be going to rehab and reports motivated for sobriety and ongoing AA attendance

## 2019-02-14 NOTE — DISCHARGE NOTE ADULT - MEDICATION SUMMARY - MEDICATIONS TO STOP TAKING
I will STOP taking the medications listed below when I get home from the hospital:    hydrALAZINE 25 mg oral tablet    losartan 100 mg oral tablet  -- 1 tab(s) by mouth once a day

## 2019-02-14 NOTE — PROGRESS NOTE BEHAVIORAL HEALTH - NSBHADMITMEDEDUDETAILS_A_CORE FT
Psychoeducation provided re: need for Rx, aftercare and sobriety adherence for sx management and relapse prevention with teach back verbalized

## 2019-02-14 NOTE — DISCHARGE NOTE ADULT - PATIENT PORTAL LINK FT
You can access the [facility] Patient Portal, offered by [facility], by registering with the following website: [email or web address]

## 2019-02-14 NOTE — DISCHARGE NOTE ADULT - ADDITIONAL INSTRUCTIONS
Tuesday, 02/19/2019, @ 8:30 AM: Substance use treatment intake appointment at [facility], located at [address] [place] [address], [place] [address], phone [phone]. Please bring the following to your appointment: 1 week's worth of comfortable clothes, insurance card, photo ID, $30-$40 in cash for laundry/phones, prescription medications in their original bottles, and toiletries (without alcohol in the first 3 ingredients.)

## 2019-02-14 NOTE — PROGRESS NOTE BEHAVIORAL HEALTH - NSBHFUPINTERVALHXFT_PSY_A_CORE
Met with and evaluated patient. Chart reviewed and case discussed in tx team meeting.  All members of team feel patient is appropriate for DC on 2/18. No significant interval events are reported.  Patient continues to report motivation for sobriety, and verbalize that he feels he will be going to an AA meeting with an AA member on 2/18, and then will go to [facility] on 2/19. Reports improved sleep with melatonin.  Patient reports he feels the hospital stay has helped him a lot.   No Rx SE are noted or reported. Patient denies any SI or HI, and reports he is hopeful about his future and maintaining sobriety. Met with and evaluated patient. Chart reviewed and case discussed in tx team meeting.  All members of team feel patient is appropriate for DC on 2/18. No significant interval events are reported.  Patient continues to report motivation for sobriety, and verbalize that he feels he will be going to an AA meeting with an AA member on 2/18, and then will go to [facility] on 2/19. Reports improved sleep with melatonin.  Patient reports he feels the hospital stay has helped him a lot.   No Rx SE are noted or reported. Patient denies any SI or HI, and reports he is hopeful about his future and maintaining sobriety.  Patient reports he is not taking the Cozaar, and does not feel he needs it as his BP is low now (today=96/73).  He reports he has not been eating salt and he feels this has helped his BP normalize.  He reports he does not want Cozaar ordered for him on discharge, and he will follow up with his primary care MD, Dr. Lugo after discharge to have BP Rx reassessed. Met with and evaluated patient. Chart reviewed and case discussed in tx team meeting.  All members of team feel patient is appropriate for DC on 2/18. No significant interval events are reported.  Patient continues to report motivation for sobriety, and verbalize that he feels he will be going to an AA meeting with an AA member on 2/18, and then will go to [facility] on 2/19. Reports improved sleep with melatonin.  Patient reports he feels the hospital stay has helped him a lot.   No Rx SE are noted or reported. Patient denies any SI or HI, and reports he is hopeful about his future and maintaining sobriety.  Patient reports he is not taking the Cozaar, and does not feel he needs it as his BP is low now (today=96/73).  He reports he has not been eating salt and he feels this has helped his BP normalize.  He reports he does not want Cozaar ordered for him on discharge, and he will follow up with his primary care MD, Dr. Lugo after discharge to have BP Rx reassessed.  Dr. Bravofi aware to follow up with patient about Cozaar.

## 2019-02-14 NOTE — DISCHARGE NOTE ADULT - NS AS ACTIVITY OBS
Walking-Outdoors allowed/Bathing allowed/Stairs allowed/Return to Work/School allowed/Walking-Indoors allowed/No Heavy lifting/straining/Showering allowed/Driving allowed

## 2019-02-14 NOTE — DISCHARGE NOTE ADULT - MEDICATION SUMMARY - MEDICATIONS TO TAKE
I will START or STAY ON the medications listed below when I get home from the hospital:    aspirin 81 mg oral tablet, chewable  -- 1 tab(s) by mouth once a day for CAD prophylaxis 30 day supply  -- Indication: For Need for prophylactic measure    traZODone 100 mg oral tablet  -- 1 tab(s) by mouth once a day (at bedtime), As needed, insomnia 30 day supply  -- Indication: For insomnia    sertraline 100 mg oral tablet  -- 1 tab(s) by mouth once a day  for depression  30 day supply  -- Indication: For Major depressive disorder, recurrent episode, severe    atorvastatin 20 mg oral tablet  -- 1 tab(s) by mouth once a day (at bedtime) for  cholesterol management  30 day supply  -- Indication: For Hyperlipidemia, unspecified hyperlipidemia type    fluticasone 50 mcg/inh nasal spray  -- 1 spray(s) into nose 2 times a day  -- Indication: For Allergies    omega-3 polyunsaturated fatty acids ethyl esters 1000 mg oral capsule  -- 2 cap(s) by mouth 2 times a day for cholesterol management  30 day supply  -- Indication: For Hyperlipidemia, unspecified hyperlipidemia type    melatonin 3 mg oral tablet  -- 1 tab(s) by mouth once a day (at bedtime)  -- Indication: For insomnia    Multiple Vitamins oral tablet  -- 1 tab(s) by mouth once a day  -- Indication: For Alcohol abuse    folic acid 1 mg oral tablet  -- 1 tab(s) by mouth once a day  -- Indication: For Alcohol abuse

## 2019-02-14 NOTE — DISCHARGE NOTE ADULT - CONDITIONS AT DISCHARGE
Patient is stable and in behavioral control with no SI, euthymic mood and motivation for aftercare and sobriety

## 2019-02-14 NOTE — DISCHARGE NOTE ADULT - HOSPITAL COURSE
43M, , domiciled with mother, works as , hx of depression, hx of suicide attempt via OD, hx of etoh abuse and rehab in 1999, last hospitalization at [facility] December 2017 for SI s/p breakup with girlfriend, with pmhx HTN brought to ED from outpatient [facility] psych for medical clearance after he tried to check himself in following OD on pills in context of financial, work and social stressors (from ex-wife regarding money and custody over children). Patient was admitted on a voluntary 9.13  Adjusted well to unit, attended  groups.  Zoloft restarted.  CAMS assessment done 1/15 with patient showing low risk for suicide. Naltrexone begun, Zoloft increased and Trazadone increased 1/16  Trazadone and Naltrexone decreased due to c/o blurred vision, and slight dizziness which continued to occur even after dose reduction, hence naltrexone was discontinued as of 1/21/19. No subsequent dizziness and blurry vision reported. Zoloft increased to 100mg PO qd on 1/30/19. Medicaid approved as of 2/4/19 as patient required insurance coverage for rehab.   CAMS assessment done again on 2/12 shows low risk for suicide. for discharge on 2/18 to [facility] rehab. Patient exhibited improved mood, reports no further SI and denies depressive sx and reports motivated for sobriety. Has been attending AA regularly on unit

## 2019-02-14 NOTE — DISCHARGE NOTE ADULT - MEDICATION SUMMARY - MEDICATIONS TO CHANGE
I will SWITCH the dose or number of times a day I take the medications listed below when I get home from the hospital:    Zoloft
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
## 2019-02-14 NOTE — DISCHARGE NOTE ADULT - CARE PLAN
Principal Discharge DX:	Severe episode of recurrent major depressive disorder, without psychotic features  Goal:	No SI and improved, stable mood  Assessment and plan of treatment:	Patient reports improved stable mood, hopeful about his future, and denies any SI  Secondary Diagnosis:	Alcohol abuse  Goal:	maintain sobriety  Assessment and plan of treatment:	Patient will be going to rehab and reports motivated for sobriety and ongoing AA attendance

## 2019-02-14 NOTE — DISCHARGE NOTE ADULT - PROVIDER TOKENS
FREE:[LAST:[sparkle],FIRST:[brie],PHONE:[phone]],FAX:[phone]   [phone]    -],ADDRESS:[[address] [place] [address]]]

## 2019-02-14 NOTE — PROGRESS NOTE BEHAVIORAL HEALTH - NSBHADMITCOUNSEL_PSY_A_CORE
importance of adherence to chosen treatment
importance of adherence to chosen treatment/risks and benefits of treatment options
importance of adherence to chosen treatment
importance of adherence to chosen treatment/risks and benefits of treatment options/risk factor reduction
importance of adherence to chosen treatment
importance of adherence to chosen treatment
risks and benefits of treatment options/importance of adherence to chosen treatment

## 2019-02-14 NOTE — DISCHARGE NOTE ADULT - REASON FOR ADMISSION
43M, , domiciled with mother, works as , hx of depression, hx of suicide attempt via OD, hx of etoh abuse and rehab in 1999, last hospitalization at [facility] December 2017 for SI s/p breakup with girlfriend, with prior hx of HTN presented to ED from outpatient [facility] psych for medical clearance after he tried to check himself in following OD on pills.   Pt states he has financial, work and social stressors (from ex-wife regarding money and custody over children). He has been particularly anxious causing significant sleep issues and persistent discomfort. He admits to declining mood over the holidays due to feeling overwhelmed but cannot identify an acute stressor. He took 10 pills of 1mg xanax and 6 pills of 25 mg zoloft yesterday around 11 pm as he felt increased anxiety which he describes as chest pain, palpitations, SOB and anxiety. This morning around 11 am he took another 3 pills of 1mg xanax and another 3 pills of 25 mg zoloft, prior to arriving at [facility]. He denies concomitant drugs/EtOH use - last drink 3 days ago. He endorses anhedonia, hopelessness, withdrawing and isolating over recent weeks. He denies hi/avh; no delusions or paranoia elici 43M, , domiciled with mother, works as , hx of depression, hx of suicide attempt via OD, hx of etoh abuse and rehab in 1999, last hospitalization at [facility] December 2017 for SI s/p breakup with girlfriend, with prior hx of HTN presented to ED from outpatient [facility] psych for medical clearance after he tried to check himself in following OD on pills.   Pt stated he had financial, work and social stressors (from ex-wife regarding money and custody over children). He had been particularly anxious causing significant sleep issues and persistent discomfort. He reported to declining mood over the holidays due to feeling overwhelmed but cannot identify an acute stressor. He took 10 pills of 1mg xanax and 6 pills of 25 mg zoloft as he felt increased anxiety which he describes as chest pain, palpitations, SOB and anxiety. He then  took another 3 pills of 1mg xanax and another 3 pills of 25 mg zoloft, prior to arriving at [facility]. He denies concomitant drugs/EtOH use - last drink 3 days ago. He endorses anhedonia, hopelessness, withdrawing and isolating over recent weeks. He denies hi/avh; no delusions or paranoia elicited.

## 2019-02-14 NOTE — DISCHARGE NOTE ADULT - CONDITION (STATED IN TERMS THAT PERMIT A SPECIFIC MEASURABLE COMPARISON WITH CONDITION ON ADMISSION):
Patient denies any SI, is not depressed and reports feeling hopeful about his future and motivated for sobriety

## 2019-02-14 NOTE — PROGRESS NOTE BEHAVIORAL HEALTH - NSBHADMITCOORDWITH_PSY_A_CORE
social work/discussed with Dr. Taylor
discussed with Dr. Taylor/social work
discussed with Dr. Taylor/social work
social work/discussed with Dr. Taylor
discussed with Dr. Taylor/social work
social work/discussed with Dr. Taylor
discussed with Dr. Taylor/social work
social work/discussed with Dr. Taylor
discussed with Dr. Taylor/social work
social work/discussed with Dr. Taylor
social work/discussed with Dr. Taylor
discussed with Dr. Taylor/social work
social work/discussed with Dr. Taylor
discussed with Dr. Taylor/social work

## 2019-02-15 PROCEDURE — 99231 SBSQ HOSP IP/OBS SF/LOW 25: CPT

## 2019-02-15 RX ADMIN — Medication 2 GRAM(S): at 21:19

## 2019-02-15 RX ADMIN — Medication 25 MILLIGRAM(S): at 18:23

## 2019-02-15 RX ADMIN — Medication 3 MILLIGRAM(S): at 21:19

## 2019-02-15 RX ADMIN — Medication 2 GRAM(S): at 08:42

## 2019-02-15 RX ADMIN — Medication 1 MILLIGRAM(S): at 08:42

## 2019-02-15 RX ADMIN — Medication 1 SPRAY(S): at 21:19

## 2019-02-15 RX ADMIN — Medication 400 MILLIGRAM(S): at 19:15

## 2019-02-15 RX ADMIN — Medication 1 TABLET(S): at 08:42

## 2019-02-15 RX ADMIN — ATORVASTATIN CALCIUM 20 MILLIGRAM(S): 80 TABLET, FILM COATED ORAL at 21:19

## 2019-02-15 RX ADMIN — Medication 81 MILLIGRAM(S): at 08:42

## 2019-02-15 RX ADMIN — SERTRALINE 100 MILLIGRAM(S): 25 TABLET, FILM COATED ORAL at 08:42

## 2019-02-15 RX ADMIN — Medication 400 MILLIGRAM(S): at 18:24

## 2019-02-15 RX ADMIN — Medication 1 SPRAY(S): at 08:42

## 2019-02-15 NOTE — PROGRESS NOTE BEHAVIORAL HEALTH - NS ED BHA MSE SPEECH VOLUME
Normal
Soft
Normal
Soft
Normal
Soft
Normal

## 2019-02-15 NOTE — PROGRESS NOTE BEHAVIORAL HEALTH - ESTIMATED INTELLIGENCE
Average

## 2019-02-15 NOTE — PROGRESS NOTE BEHAVIORAL HEALTH - NSBHADMITIPOBS_PSY_A_CORE
Routine observation

## 2019-02-15 NOTE — PROGRESS NOTE BEHAVIORAL HEALTH - AFFECT CONGRUENCE
Congruent

## 2019-02-15 NOTE — PROGRESS NOTE BEHAVIORAL HEALTH - BODY HABITUS
Average build
Average build
Well nourished/Average build
Average build
Average build/Well nourished
Well nourished/Average build
Average build
Average build
Well nourished/Average build
Well nourished/Average build
Average build
Average build/Well nourished
Well nourished/Average build
Average build
Average build
Average build/Well nourished
Average build
Average build
Well nourished/Average build
Average build/Well nourished
Average build/Well nourished

## 2019-02-15 NOTE — PROGRESS NOTE BEHAVIORAL HEALTH - AFFECT QUALITY
Euthymic
Euthymic/Other
Euthymic/Other
Other/Euthymic
Depressed/Other
Euthymic
Other
Euthymic
Euthymic
Other/Euthymic
Other/Euthymic
Depressed/Other
Euthymic
Euthymic
Depressed
Other
Other
Euthymic/Other
Other/Euthymic
Depressed
Other/Depressed
Other
Euthymic/Other
Euthymic
Other/Euthymic
Depressed
Other/Euthymic
Other/Euthymic

## 2019-02-15 NOTE — PROGRESS NOTE BEHAVIORAL HEALTH - NSBHADMITIPOBSFT_PSY_A_CORE
denies current SI, and reports will tell staff if he has SI or thoughts of harming self or others.
reports he sometimes wishes he wasn't here, but has no active SI or plan, and reports will tell staff if he has SI or thoughts of harming self or others.
denies current SI, and reports will tell staff if he has SI or thoughts of harming self or others.
reports he sometimes wished he wasn't  here, but has no active SI or plan, and reports will tell staff if he has SI or thoughts of harming self or others.
denies current SI, and reports will tell staff if he has SI or thoughts of harming self or others.
has no  SI or plan, and reports will tell staff if he has SI or thoughts of harming self or others.
denies current SI, and reports will tell staff if he has SI or thoughts of harming self or others.
has been calm, cooperative.  Reports he feels safe in the hospital, and if he has thoughts of harming self or others he will tell staff
has no  SI or plan, and reports will tell staff if he has SI or thoughts of harming self or others.
denies current SI, and reports will tell staff if he has SI or thoughts of harming self or others.

## 2019-02-15 NOTE — PROGRESS NOTE BEHAVIORAL HEALTH - NS ED BHA AXIS I PRIMARY CODE FT
F33.2

## 2019-02-15 NOTE — PROGRESS NOTE BEHAVIORAL HEALTH - NSBHADMITMEDEDU_PSY_A_CORE
yes...

## 2019-02-15 NOTE — PROGRESS NOTE BEHAVIORAL HEALTH - NS ED BHA MED ROS PSYCHIATRIC
See HPI

## 2019-02-15 NOTE — PROGRESS NOTE BEHAVIORAL HEALTH - NSBHPTASSESSDT_PSY_A_CORE
01-Feb-2019 15:41
07-Feb-2019
08-Feb-2019
12-Feb-2019 16:13
13-Feb-2019 16:48
14-Feb-2019
14-Jan-2019 12:44
15-Feb-2019 15:13
16-Jan-2019 13:03
17-Jan-2019 16:05
21-Jan-2019
22-Jan-2019
27-Jan-2019
28-Jan-2019 16:24
30-Jan-2019
31-Jan-2019 16:20
04-Feb-2019
18-Jan-2019 17:13
20-Jan-2019
23-Jan-2019
11-Feb-2019 16:50
24-Jan-2019
05-Feb-2019
06-Feb-2019
25-Jan-2019
15-Will-2019 15:08
29-Jan-2019 16:45
26-Jan-2019

## 2019-02-15 NOTE — PROGRESS NOTE BEHAVIORAL HEALTH - SUMMARY
43M, , domiciled with mother, works as , hx of depression, hx of suicide attempt via OD, hx of etoh abuse and rehab in 1999, last hopsitalization at [facility] December 2017 for SI s/p breakup with girlfriend, with pmhx HTN presents BIBA from outpatient [facility] psych for medical clearance after he tried to check himself in following OD on pills in context of financial, work and social stressors (from ex-wife regarding money and custody over children). Patient was admitted on a voluntary 9.13  Adjusting to unit, attending groups.  Zoloft restarted.  CAMS assessment done 1/15 with patient showing low risk for suicide. Naltrexone begun, Zoloft increased and Trazadone increased 1/16  Trazadone and Naltrexone decreased due to c/o blurred vision, and slight dizziness which continued to occur even after dose reduction, hence naltrexone was discontinued as of 1/21/19. No subsequent dizziness and blurry vision reported. Zoloft increased to 100mg PO qd on 1/30/19.
43M, , domiciled with mother, works as , hx of depression, hx of suicide attempt via OD, hx of etoh abuse and rehab in 1999, last hopsitalization at [facility] December 2017 for SI s/p breakup with girlfriend, with pmhx HTN presents BIBA from outpatient [facility] psych for medical clearance after he tried to check himself in following OD on pills in context of financial, work and social stressors (from ex-wife regarding money and custody over children). Patient was admitted on a voluntary 9.13  Adjusting to unit, attending groups.  Zoloft restarted.  CAMS assessment done 1/15 with patient showing low risk for suicide. Naltrexone begun, Zoloft increased and Trazadone increased 1/16  Trazadone and Naltrexone decreased due to c/o blurred vision, and slight dizziness which continued to occur even after dose reduction, hence naltrexone was discontinued as of 1/21/19. No subsequent dizziness and blurry vision reported. Zoloft increased to 100mg PO qd on 1/30/19. Medicaid approved as of 2/4/19.  CAMS assessment done again on 2/12 shows low risk for suicide. for discharge on 2/18 to [facility] rehab
43M, , domiciled with mother, works as , hx of depression, hx of suicide attempt via OD, hx of etoh abuse and rehab in 1999, last hopsitalization at [facility] December 2017 for SI s/p breakup with girlfriend, with pmhx HTN presents BIBA from outpatient [facility] psych for medical clearance after he tried to check himself in following OD on pills in context of financial, work and social stressors (from ex-wife regarding money and custody over children). Patient was admitted on a voluntary 9.13  Adjusting to unit, attending groups.  Zoloft restarted
43M, , domiciled with mother, works as , hx of depression, hx of suicide attempt via OD, hx of etoh abuse and rehab in 1999, last hopsitalization at [facility] December 2017 for SI s/p breakup with girlfriend, with pmhx HTN presents BIBA from outpatient [facility] psych for medical clearance after he tried to check himself in following OD on pills in context of financial, work and social stressors (from ex-wife regarding money and custody over children). Patient was admitted on a voluntary 9.13  Adjusting to unit, attending groups.  Zoloft restarted.  CAMS assessment done 1/15 with patient showing low risk for suicide. Naltrexone begun, Zoloft increased and Trazadone increased 1/16  Trazadone and Naltrexone decreased due to c/o blurred vision, and slight dizziness which continued to occur even after dose reduction, hence naltrexone was discontinued as of 1/21/19. No subsequent dizziness and blurry vision reported. Zoloft increased to 100mg PO qd on 1/30/19. Medicaid approved as of 2/4/19.
43M, , domiciled with mother, works as , hx of depression, hx of suicide attempt via OD, hx of etoh abuse and rehab in 1999, last hopsitalization at [facility] December 2017 for SI s/p breakup with girlfriend, with pmhx HTN presents BIBA from outpatient [facility] psych for medical clearance after he tried to check himself in following OD on pills in context of financial, work and social stressors (from ex-wife regarding money and custody over children). Patient was admitted on a voluntary 9.13  Adjusting to unit, attending groups.  Zoloft restarted.  CAMS assessment done 1/15 with patient showing low risk for suicide. Naltrexone begun, Zoloft increased and Trazadone increased 1/16  Trazadone and Naltrexone decreased due to c/o blurred vision, and slight dizziness which continued to occur even after dose reduction, hence naltrexone was discontinued as of 1/21/19. No subsequent dizziness and blurry vision reported.
43M, , domiciled with mother, works as , hx of depression, hx of suicide attempt via OD, hx of etoh abuse and rehab in 1999, last hopsitalization at [facility] December 2017 for SI s/p breakup with girlfriend, with pmhx HTN presents BIBA from outpatient [facility] psych for medical clearance after he tried to check himself in following OD on pills in context of financial, work and social stressors (from ex-wife regarding money and custody over children). Patient was admitted on a voluntary 9.13  Adjusting to unit, attending groups.  Zoloft restarted.  CAMS assessment done 1/15 with patient showing low risk for suicide. Naltrexone begun, Zoloft increased and Trazadone increased 1/16  Trazadone and Naltrexone decreased due to c/o blurred vision, and slight dizziness which continued to occur even after dose reduction, hence naltrexone was discontinued as of 1/21/19. No subsequent dizziness and blurry vision reported. Zoloft increased to 100mg PO qd on 1/30/19. Medicaid approved as of 2/4/19.
43M, , domiciled with mother, works as , hx of depression, hx of suicide attempt via OD, hx of etoh abuse and rehab in 1999, last hopsitalization at [facility] December 2017 for SI s/p breakup with girlfriend, with pmhx HTN presents BIBA from outpatient [facility] psych for medical clearance after he tried to check himself in following OD on pills in context of financial, work and social stressors (from ex-wife regarding money and custody over children). Patient was admitted on a voluntary 9.13  Adjusting to unit, attending groups.  Zoloft restarted.  CAMS assessment done 1/15 with patient showing low risk for suicide. Naltrexone begun, Zoloft increased and Trazadone increased 1/16  Trazadone and Naltrexone decreased due to c/o blurred vision, and slight dizziness which continued to occur even after dose reduction, hence naltrexone was discontinued as of 1/21/19. No subsequent dizziness and blurry vision reported. Zoloft increased to 100mg PO qd on 1/30/19. Medicaid approved as of 2/4/19.
43M, , domiciled with mother, works as , hx of depression, hx of suicide attempt via OD, hx of etoh abuse and rehab in 1999, last hopsitalization at [facility] December 2017 for SI s/p breakup with girlfriend, with pmhx HTN presents BIBA from outpatient [facility] psych for medical clearance after he tried to check himself in following OD on pills in context of financial, work and social stressors (from ex-wife regarding money and custody over children). Patient was admitted on a voluntary 9.13  Adjusting to unit, attending groups.  Zoloft restarted.  CAMS assessment done 1/15 with patient showing low risk for suicide. Naltrexone begun, Zoloft increased and Trazadone increased 1/16  Trazadone and Naltrexone decreased due to c/o blurred vision, and slight dizziness which continued to occur even after dose reduction, hence naltrexone was discontinued as of 1/21/19. No subsequent dizziness and blurry vision reported. Zoloft increased to 100mg PO qd on 1/30/19. Medicaid approved as of 2/4/19.  CAMS assessment done again on 2/12 shows low risk for suicide. for discharge on 2/18 to [facility] rehab
43M, , domiciled with mother, works as , hx of depression, hx of suicide attempt via OD, hx of etoh abuse and rehab in 1999, last hopsitalization at [facility] December 2017 for SI s/p breakup with girlfriend, with pmhx HTN presents BIBA from outpatient [facility] psych for medical clearance after he tried to check himself in following OD on pills in context of financial, work and social stressors (from ex-wife regarding money and custody over children). Patient was admitted on a voluntary 9.13  Adjusting to unit, attending groups.  Zoloft restarted.  CAMS assessment done 1/15 with patient showing low risk for suicide. Naltrexone begun, Zoloft increased and Trazadone increased 1/16  Trazadone and Naltrexone decreased due to c/o blurred vision, and slight dizziness which continued to occur even after dose reduction, hence naltrexone was discontinued as of 1/21/19. No subsequent dizziness and blurry vision reported.
43M, , domiciled with mother, works as , hx of depression, hx of suicide attempt via OD, hx of etoh abuse and rehab in 1999, last hopsitalization at [facility] December 2017 for SI s/p breakup with girlfriend, with pmhx HTN presents BIBA from outpatient [facility] psych for medical clearance after he tried to check himself in following OD on pills in context of financial, work and social stressors (from ex-wife regarding money and custody over children). Patient was admitted on a voluntary 9.13  Adjusting to unit, attending groups.  Zoloft restarted.  CAMS assessment done 1/15 with patient showing low risk for suicide. Naltrexone begun, Zoloft increased and Trazadone increased 1/16  Trazadone and Naltrexone decreased due to c/o blurred vision, and slight dizziness which continued to occur even after dose reduction, hence naltrexone was discontinued as of 1/21/19. No subsequent dizziness and blurry vision reported.
43M, , domiciled with mother, works as , hx of depression, hx of suicide attempt via OD, hx of etoh abuse and rehab in 1999, last hopsitalization at [facility] December 2017 for SI s/p breakup with girlfriend, with pmhx HTN presents BIBA from outpatient [facility] psych for medical clearance after he tried to check himself in following OD on pills in context of financial, work and social stressors (from ex-wife regarding money and custody over children). Patient was admitted on a voluntary 9.13  Adjusting to unit, attending groups.  Zoloft restarted.  CAMS assessment done 1/15 with patient showing low risk for suicide. Naltrexone begun, Zoloft increased and Trazadone increased 1/16  Trazadone and Naltrexone decreased due to c/o blurred vision, and slight dizziness which continued to occur even after dose reduction, hence naltrexone was discontinued as of 1/21/19. No subsequent dizziness and blurry vision reported.
43M, , domiciled with mother, works as , hx of depression, hx of suicide attempt via OD, hx of etoh abuse and rehab in 1999, last hopsitalization at [facility] December 2017 for SI s/p breakup with girlfriend, with pmhx HTN presents BIBA from outpatient [facility] psych for medical clearance after he tried to check himself in following OD on pills in context of financial, work and social stressors (from ex-wife regarding money and custody over children). Patient was admitted on a voluntary 9.13  Adjusting to unit, attending groups.  Zoloft restarted.  CAMS assessment done 1/15 with patient showing low risk for suicide. Naltrexone begun, Zoloft increased and Trazadone increased 1/16  Trazadone and Naltrexone decreased due to c/o blurred vision, and slight dizziness which continued to occur even after dose reduction, hence naltrexone was discontinued as of 1/21/19. No subsequent dizziness and blurry vision reported.
43M, , domiciled with mother, works as , hx of depression, hx of suicide attempt via OD, hx of etoh abuse and rehab in 1999, last hopsitalization at [facility] December 2017 for SI s/p breakup with girlfriend, with pmhx HTN presents BIBA from outpatient [facility] psych for medical clearance after he tried to check himself in following OD on pills in context of financial, work and social stressors (from ex-wife regarding money and custody over children). Patient was admitted on a voluntary 9.13  Adjusting to unit, attending groups.  Zoloft restarted.  CAMS assessment done 1/15 with patient showing low risk for suicide. Naltrexone begun, Zoloft increased and Trazadone increased 1/16  Trazadone and Naltrexone decreased due to c/o blurred vision, and slight dizziness which continued to occur even after dose reduction, hence naltrexone was discontinued as of 1/21/19. No subsequent dizziness and blurry vision reported. Zoloft increased to 100mg PO qd on 1/30/19. Medicaid approved as of 2/4/19.
43M, , domiciled with mother, works as , hx of depression, hx of suicide attempt via OD, hx of etoh abuse and rehab in 1999, last hopsitalization at [facility] December 2017 for SI s/p breakup with girlfriend, with pmhx HTN presents BIBA from outpatient [facility] psych for medical clearance after he tried to check himself in following OD on pills in context of financial, work and social stressors (from ex-wife regarding money and custody over children). Patient was admitted on a voluntary 9.13  Adjusting to unit, attending groups.  Zoloft restarted.  CAMS assessment done 1/15 with patient showing low risk for suicide. Naltrexone begun, Zoloft increased and Trazadone increased 1/16  Trazadone and Naltrexone decreased due to c/o blurred vision, and slight dizziness which continued to occur even after dose reduction, hence naltrexone was discontinued as of 1/21/19. No subsequent dizziness and blurry vision reported. Zoloft increased to 100mg PO qd on 1/30/19. Medicaid approved as of 2/4/19.
43M, , domiciled with mother, works as , hx of depression, hx of suicide attempt via OD, hx of etoh abuse and rehab in 1999, last hopsitalization at [facility] December 2017 for SI s/p breakup with girlfriend, with pmhx HTN presents BIBA from outpatient [facility] psych for medical clearance after he tried to check himself in following OD on pills in context of financial, work and social stressors (from ex-wife regarding money and custody over children). Patient was admitted on a voluntary 9.13  Adjusting to unit, attending groups.  Zoloft restarted.  CAMS assessment done 1/15 with patient showing low risk for suicide. Naltrexone begun, Zoloft increased and Trazadone increased 1/16  Trazadone and Naltrexone decreased due to c/o blurred vision, and slight dizziness which continued to occur even after dose reduction, hence naltrexone was discontinued as of 1/21/19.
43M, , domiciled with mother, works as , hx of depression, hx of suicide attempt via OD, hx of etoh abuse and rehab in 1999, last hopsitalization at [facility] December 2017 for SI s/p breakup with girlfriend, with pmhx HTN presents BIBA from outpatient [facility] psych for medical clearance after he tried to check himself in following OD on pills in context of financial, work and social stressors (from ex-wife regarding money and custody over children). Patient was admitted on a voluntary 9.13  Adjusting to unit, attending groups.  Zoloft restarted.  CAMS assessment done 1/15 with patient showing low risk for suicide. Naltrexone begun, Zoloft increased and Trazadone increased 1/16  Trazadone and Naltrexone decreased due to c/o blurred vision, and slight dizziness which continued to occur even after dose reduction, hence naltrexone was discontinued as of 1/21/19.
43M, , domiciled with mother, works as , hx of depression, hx of suicide attempt via OD, hx of etoh abuse and rehab in 1999, last hopsitalization at [facility] December 2017 for SI s/p breakup with girlfriend, with pmhx HTN presents BIBA from outpatient [facility] psych for medical clearance after he tried to check himself in following OD on pills in context of financial, work and social stressors (from ex-wife regarding money and custody over children). Patient was admitted on a voluntary 9.13  Adjusting to unit, attending groups.  Zoloft restarted.  CAMS assessment done 1/15 with patient showing low risk for suicide. Naltrexone begun, Zoloft increased and Trazadone increased 1/16  Trazadone and Naltrexone decreased due to c/o blurred vision, and slight dizziness which continued to occur even after dose reduction, hence naltrexone was discontinued as of 1/21/19. No subsequent dizziness and blurry vision reported.
43M, , domiciled with mother, works as , hx of depression, hx of suicide attempt via OD, hx of etoh abuse and rehab in 1999, last hopsitalization at [facility] December 2017 for SI s/p breakup with girlfriend, with pmhx HTN presents BIBA from outpatient [facility] psych for medical clearance after he tried to check himself in following OD on pills in context of financial, work and social stressors (from ex-wife regarding money and custody over children). Patient was admitted on a voluntary 9.13  Adjusting to unit, attending groups.  Zoloft restarted.  CAMS assessment done 1/15 with patient showing low risk for suicide. Naltrexone begun, Zoloft increased and Trazadone increased 1/16  Trazadone and Naltrexone decreased due to c/o blurred vision, and slight dizziness which continued to occur even after dose reduction, hence naltrexone was discontinued as of 1/21/19.
43M, , domiciled with mother, works as , hx of depression, hx of suicide attempt via OD, hx of etoh abuse and rehab in 1999, last hopsitalization at [facility] December 2017 for SI s/p breakup with girlfriend, with pmhx HTN presents BIBA from outpatient [facility] psych for medical clearance after he tried to check himself in following OD on pills in context of financial, work and social stressors (from ex-wife regarding money and custody over children). Patient was admitted on a voluntary 9.13  Adjusting to unit, attending groups.  Zoloft restarted.  CAMS assessment done 1/15 with patient showing low risk for suicide. Naltrexone begun, Zoloft increased and Trazadone increased 1/16  Trazadone and Naltrexone decreased due to c/o blurred vision, and slight dizziness which continued to occur even after dose reduction, hence naltrexone was discontinued as of 1/21/19. No subsequent dizziness and blurry vision reported.
43M, , domiciled with mother, works as , hx of depression, hx of suicide attempt via OD, hx of etoh abuse and rehab in 1999, last hopsitalization at [facility] December 2017 for SI s/p breakup with girlfriend, with pmhx HTN presents BIBA from outpatient [facility] psych for medical clearance after he tried to check himself in following OD on pills in context of financial, work and social stressors (from ex-wife regarding money and custody over children). Patient was admitted on a voluntary 9.13  Adjusting to unit, attending groups.  Zoloft restarted.  CAMS assessment done 1/15 with patient showing low risk for suicide. Naltrexone begun, Zoloft increased and Trazadone increased 1/16  Trazadone and Naltrexone decreased due to c/o blurred vision, and slight dizziness which continued to occur even after dose reduction, hence naltrexone was discontinued as of 1/21/19. No subsequent dizziness and blurry vision reported.
43M, , domiciled with mother, works as , hx of depression, hx of suicide attempt via OD, hx of etoh abuse and rehab in 1999, last hopsitalization at [facility] December 2017 for SI s/p breakup with girlfriend, with pmhx HTN presents BIBA from outpatient [facility] psych for medical clearance after he tried to check himself in following OD on pills in context of financial, work and social stressors (from ex-wife regarding money and custody over children). Patient was admitted on a voluntary 9.13  Adjusting to unit, attending groups.  Zoloft restarted.  CAMS assessment done 1/15 with patient showing low risk for suicide. Naltrexone begun, Zoloft increased and Trazadone increased 1/16  Trazadone and Naltrexone decreased due to c/o blurred vision, and slight dizziness which continued to occur even after dose reduction, hence naltrexone was discontinued as of 1/21/19. No subsequent dizziness and blurry vision reported.
43M, , domiciled with mother, works as , hx of depression, hx of suicide attempt via OD, hx of etoh abuse and rehab in 1999, last hopsitalization at [facility] December 2017 for SI s/p breakup with girlfriend, with pmhx HTN presents BIBA from outpatient [facility] psych for medical clearance after he tried to check himself in following OD on pills in context of financial, work and social stressors (from ex-wife regarding money and custody over children). Patient was admitted on a voluntary 9.13  Adjusting to unit, attending groups.  Zoloft restarted.  CAMS assessment done 1/15 with patient showing low risk for suicide. Naltrexone begun, Zoloft increased and Trazadone increased 1/16  Trazadone and Naltrexone decreased due to c/o blurred vision, and slight dizziness which continued to occur even after dose reduction, hence naltrexone was discontinued as of 1/21/19. No subsequent dizziness and blurry vision reported. Zoloft increased to 100mg PO qd on 1/30/19. Medicaid approved as of 2/4/19.  CAMS assessment done again on 2/12 shows low risk for suicide.
43M, , domiciled with mother, works as , hx of depression, hx of suicide attempt via OD, hx of etoh abuse and rehab in 1999, last hopsitalization at [facility] December 2017 for SI s/p breakup with girlfriend, with pmhx HTN presents BIBA from outpatient [facility] psych for medical clearance after he tried to check himself in following OD on pills in context of financial, work and social stressors (from ex-wife regarding money and custody over children). Patient was admitted on a voluntary 9.13  Adjusting to unit, attending groups.  Zoloft restarted.  CAMS assessment done 1/15 with patient showing low risk for suicide. Naltrexone begun, Zoloft increased and Trazadone increased 1/16
43M, , domiciled with mother, works as , hx of depression, hx of suicide attempt via OD, hx of etoh abuse and rehab in 1999, last hopsitalization at [facility] December 2017 for SI s/p breakup with girlfriend, with pmhx HTN presents BIBA from outpatient [facility] psych for medical clearance after he tried to check himself in following OD on pills in context of financial, work and social stressors (from ex-wife regarding money and custody over children). Patient was admitted on a voluntary 9.13  Adjusting to unit, attending groups.  Zoloft restarted.  CAMS assessment done 1/15 with patient showing low risk for suicide. Naltrexone begun, Zoloft increased and Trazadone increased 1/16  Trazadone and Naltrexone decreased due to c/o blurred vision, and slight dizziness which continued to occur even after dose reduction, hence naltrexone was discontinued as of 1/21/19. No subsequent dizziness and blurry vision reported. Zoloft increased to 100mg PO qd on 1/30/19. Medicaid approved as of 2/4/19.  CAMS assessment done again on 2/12 shows low risk for suicide.
43M, , domiciled with mother, works as , hx of depression, hx of suicide attempt via OD, hx of etoh abuse and rehab in 1999, last hopsitalization at [facility] December 2017 for SI s/p breakup with girlfriend, with pmhx HTN presents BIBA from outpatient [facility] psych for medical clearance after he tried to check himself in following OD on pills in context of financial, work and social stressors (from ex-wife regarding money and custody over children). Patient was admitted on a voluntary 9.13  Adjusting to unit, attending groups.  Zoloft restarted.  CAMS assessment done 1/15 with patient showing low risk for suicide. Naltrexone begun, Zoloft increased and Trazadone increased 1/16  Trazadone and Naltrexone decreased due to c/o blurred vision, and slight dizziness
43M, , domiciled with mother, works as , hx of depression, hx of suicide attempt via OD, hx of etoh abuse and rehab in 1999, last hopsitalization at [facility] December 2017 for SI s/p breakup with girlfriend, with pmhx HTN presents BIBA from outpatient [facility] psych for medical clearance after he tried to check himself in following OD on pills in context of financial, work and social stressors (from ex-wife regarding money and custody over children). Patient was admitted on a voluntary 9.13  Adjusting to unit, attending groups.  Zoloft restarted.  CAMS assessment done 1/15 with patient showing low risk for suicide. Naltrexone begun, Zoloft increased and Trazadone increased 1/16
43M, , domiciled with mother, works as , hx of depression, hx of suicide attempt via OD, hx of etoh abuse and rehab in 1999, last hopsitalization at [facility] December 2017 for SI s/p breakup with girlfriend, with pmhx HTN presents BIBA from outpatient [facility] psych for medical clearance after he tried to check himself in following OD on pills in context of financial, work and social stressors (from ex-wife regarding money and custody over children). Patient was admitted on a voluntary 9.13  Adjusting to unit, attending groups.  Zoloft restarted.  CAMS assessment done 1/15 with patient showing low risk for suicide. Naltrexone begun, Zoloft increased and Trazadone increased 1/16  Trazadone and Naltrexone decreased due to c/o blurred vision, and slight dizziness which continued to occur even after dose reduction, hence naltrexone was discontinued as of 1/21/19. No subsequent dizziness and blurry vision reported.
43M, , domiciled with mother, works as , hx of depression, hx of suicide attempt via OD, hx of etoh abuse and rehab in 1999, last hopsitalization at [facility] December 2017 for SI s/p breakup with girlfriend, with pmhx HTN presents BIBA from outpatient [facility] psych for medical clearance after he tried to check himself in following OD on pills in context of financial, work and social stressors (from ex-wife regarding money and custody over children). Patient was admitted on a voluntary 9.13  Adjusting to unit, attending groups.  Zoloft restarted.  CAMS assessment done 1/15 with patient showing low risk for suicide.

## 2019-02-15 NOTE — PROGRESS NOTE BEHAVIORAL HEALTH - LANGUAGE
No abnormalities noted

## 2019-02-15 NOTE — PROGRESS NOTE BEHAVIORAL HEALTH - NSBHATTESTSEENBY_PSY_A_CORE
attending Psychiatrist without NP/Trainee
attending Psychiatrist without NP/Trainee
NP without Attending Psychiatrist
NP without Attending Psychiatrist
attending Psychiatrist without NP/Trainee
NP without Attending Psychiatrist
attending Psychiatrist without NP/Trainee
NP without Attending Psychiatrist
NP without Attending Psychiatrist
attending Psychiatrist without NP/Trainee
NP without Attending Psychiatrist
NP without Attending Psychiatrist
attending Psychiatrist without NP/Trainee
attending Psychiatrist without NP/Trainee
NP without Attending Psychiatrist

## 2019-02-15 NOTE — PROGRESS NOTE BEHAVIORAL HEALTH - NSBHFUPINTERVALCCFT_PSY_A_CORE
" Happy to go on Monday to his mother's house "
" I am feeling better"
" I am feeling much better"
" I am glad I am going to [facility]"
" I am glad looking forward to seeing my children Monday, and Monday evening I will go to an AA meeting"
" I didn't sleep too well last night"
" I feel I am reyes to be alive.  My children mean so much to me"
" I feel a little better"
" I feel down since yesterday"
" I slept better last night"
" I have been under a lot of stress and felt I didn't want to live anymore"
" I am thinking clearer, but my eyes are blurry sometimes and I feel a little dizzy sometimes"
" I think I will do well in rehab.  It will help me"
" I have been under a lot of stress and felt I didn't want to live anymore"
" I still feel depressed sometimes"

## 2019-02-15 NOTE — PROGRESS NOTE BEHAVIORAL HEALTH - NSBHFUPVIOL_PSY_A_CORE
none known

## 2019-02-15 NOTE — PROGRESS NOTE BEHAVIORAL HEALTH - NSBHADMITDANGERSELF_PSY_A_CORE
no SI on 1/18/suicidal behavior
no current SI/suicidal behavior
no SI on 2/11/suicidal behavior
no SI on 1/18/suicidal behavior
no SI on 1/18/suicidal behavior
suicidal behavior/no SI on 1/18
suicidal behavior/no current SI
suicidal behavior/no SI on 1/18
suicidal behavior
no SI on 1/18/suicidal behavior
suicidal behavior/no SI on 1/18
no SI on 1/18/suicidal behavior
suicidal behavior
suicidal behavior/no SI on 1/28
suicidal behavior/no SI on 2/1
suicidal behavior/no SI on 2/12
no SI on 1/16/suicidal behavior
no SI on 1/18/suicidal behavior
suicidal behavior
no SI on 1/17/suicidal behavior
suicidal behavior
suicidal behavior
no SI on 1/15/suicidal behavior

## 2019-02-15 NOTE — PROGRESS NOTE BEHAVIORAL HEALTH - NS ED BHA MSE SPEECH SPONTANEITY
Normal

## 2019-02-15 NOTE — PROGRESS NOTE BEHAVIORAL HEALTH - NSBHCONSORIP_PSY_A_CORE
Inpatient Admission...

## 2019-02-15 NOTE — PROGRESS NOTE BEHAVIORAL HEALTH - GAIT / STATION
Normal gait / station

## 2019-02-15 NOTE — PROGRESS NOTE BEHAVIORAL HEALTH - JUDGMENT (REGARDING EVERYDAY EVENTS)
Poor
Fair
Good
Fair
Good
Fair
Good
Good
Fair
Good
Other
Fair
Other
Other
Fair
Good

## 2019-02-15 NOTE — PROGRESS NOTE BEHAVIORAL HEALTH - NSBHLOC_PSY_A_CORE
Alert

## 2019-02-15 NOTE — PROGRESS NOTE BEHAVIORAL HEALTH - NSBHFUPINTERVALHXFT_PSY_A_CORE
Met with and evaluated patient. Chart reviewed and case discussed in tx team meeting.  appropriate for discharge on Monday 02/18/2019 and is looking forward to go on that date. mod is in good spirits. To continuer current meds for stability.

## 2019-02-15 NOTE — PROGRESS NOTE BEHAVIORAL HEALTH - NSBHADMITIPBHPROVIDER_PSY_A_CORE
does not have one at this time/N/A
does not have one at this time/N/A
N/A/does not have one at this time
does not have one at this time/N/A
N/A/does not have one at this time
N/A/does not have one at this time
does not have one at this time/N/A
N/A/does not have one at this time
does not have one at this time/N/A
N/A/does not have one at this time
does not have one at this time/N/A
does not have one at this time/N/A
N/A/does not have one at this time
N/A/does not have one at this time

## 2019-02-15 NOTE — PROGRESS NOTE BEHAVIORAL HEALTH - ATTENTION / CONCENTRATION
Other
Normal
Other
Normal
Other
Other
Normal

## 2019-02-15 NOTE — PROGRESS NOTE BEHAVIORAL HEALTH - INSIGHT (REGARDING PSYCHIATRIC ILLNESS)
Good
Fair
Good
Fair
Good
Fair
Good
Good
Fair
Good
Fair
Good
Good

## 2019-02-15 NOTE — PROGRESS NOTE BEHAVIORAL HEALTH - AFFECT RANGE
Full
Constricted
Full
Constricted
Full
Full
Constricted
Full
Constricted
Full
Full

## 2019-02-15 NOTE — PROGRESS NOTE BEHAVIORAL HEALTH - NS ED BHA MED ROS NEUROLOGICAL
No complaints

## 2019-02-15 NOTE — PROGRESS NOTE BEHAVIORAL HEALTH - BEHAVIOR
Cooperative

## 2019-02-15 NOTE — PROGRESS NOTE BEHAVIORAL HEALTH - NS ED BHA MSE GENERAL APPEARANCE
Well developed/No deformities present
Well developed/No deformities present
No deformities present/Well developed
Well developed/No deformities present
No deformities present/Well developed
Well developed/No deformities present
No deformities present/Well developed
No deformities present/Well developed
Well developed/No deformities present
No deformities present/Well developed
Well developed/No deformities present
No deformities present/Well developed
Well developed/No deformities present
No deformities present/Well developed
No deformities present/Well developed
Well developed/No deformities present
No deformities present/Well developed
Well developed/No deformities present
No deformities present/Well developed
Well developed/No deformities present
No deformities present/Well developed
Well developed/No deformities present

## 2019-02-15 NOTE — PROGRESS NOTE BEHAVIORAL HEALTH - PERCEPTIONS
No abnormalities

## 2019-02-15 NOTE — PROGRESS NOTE BEHAVIORAL HEALTH - ORIENTED TO PERSON
Yes

## 2019-02-15 NOTE — PROGRESS NOTE BEHAVIORAL HEALTH - ABNORMAL MOVEMENTS
No abnormal movements

## 2019-02-15 NOTE — PROGRESS NOTE BEHAVIORAL HEALTH - NSBHFUPSUICINTERVAL_PSY_A_CORE
none known
none known
yes
none known
yes
none known

## 2019-02-15 NOTE — PROGRESS NOTE BEHAVIORAL HEALTH - NSBHFUPMEDSE_PSY_A_CORE
None known
Yes
None known

## 2019-02-15 NOTE — PROGRESS NOTE BEHAVIORAL HEALTH - THOUGHT PROCESS
Linear
Linear/Normal reasoning
Normal reasoning/Linear
Linear/Normal reasoning
Linear/Normal reasoning
Linear
Linear
Linear/Normal reasoning
Linear
Linear/Normal reasoning
Normal reasoning/Linear
Linear
Linear
Linear/Normal reasoning
Normal reasoning/Linear
Linear
Linear/Normal reasoning
Normal reasoning/Linear
Linear
Normal reasoning/Linear
Linear
Normal reasoning/Linear
Linear/Normal reasoning
Linear
Linear/Normal reasoning

## 2019-02-15 NOTE — PROGRESS NOTE BEHAVIORAL HEALTH - NSBHFUPTYPE_PSY_A_CORE
Inpatient

## 2019-02-15 NOTE — PROGRESS NOTE BEHAVIORAL HEALTH - GROOMING
Good

## 2019-02-15 NOTE — PROGRESS NOTE BEHAVIORAL HEALTH - MOOD
Other
Normal/Other
Depressed/Other
Other
Other/Depressed
Other/Normal
Other
Normal/Other
Other
Other/Normal
Depressed/Other
Other
Other
Depressed/Anxious/Other
Depressed/Other
Anxious/Depressed
Other/Normal
Other/Normal
Depressed
Depressed/Other
Anxious/Depressed/Other
Other/Normal
Other
Depressed/Anxious
Normal/Other
Normal/Other

## 2019-02-15 NOTE — PROGRESS NOTE BEHAVIORAL HEALTH - PRIMARY DX
Major depressive disorder, recurrent episode, severe

## 2019-02-15 NOTE — PROGRESS NOTE BEHAVIORAL HEALTH - NS ED BHA REVIEW OF ED CHART VITAL SIGNS REVIEWED
Yes

## 2019-02-15 NOTE — PROGRESS NOTE BEHAVIORAL HEALTH - NSBHADMITIPDSM_PSY_A_CORE
see above for Axis I, II, III

## 2019-02-15 NOTE — PROGRESS NOTE BEHAVIORAL HEALTH - NSBHADMITIPREASON_PSY_A_CORE
Danger to self; mental illness expected to respond to inpatient care

## 2019-02-15 NOTE — PROGRESS NOTE BEHAVIORAL HEALTH - NS ED BHA MED ROS CONSTITUTIONAL SYMPTOMS
No complaints
Yes
No complaints
Yes
Yes
No complaints
Yes
Yes
No complaints
Yes
No complaints
Yes

## 2019-02-15 NOTE — PROGRESS NOTE BEHAVIORAL HEALTH - NSBHLEGALSTATUS_PSY_A_CORE
9.13 (Voluntary)

## 2019-02-15 NOTE — PROGRESS NOTE BEHAVIORAL HEALTH - MUSCLE TONE / STRENGTH
Normal muscle tone/strength

## 2019-02-15 NOTE — PROGRESS NOTE BEHAVIORAL HEALTH - NSBHCHARTREVIEWLAB_PSY_A_CORE FT
CBC and CMP ordered for 2/12
FhiW8Q=6.6  TSH and RPR ordered
OeaO0A=5.6  TSH and RPR WNL  Hepatic function panel and serum ammonia WNL on 1/15 all WNL
VixB4T=4.6  TSH and RPR WNL  Hepatic function panel and serum ammonia WNL on 1/15
WBC on 2/12=3.15   CBC with diff ordered for 2/13
WBC on 2/12=3.15   WBC on 2/13=3.39  Dr. Davis aware to review labs
QwiE0J=3.6  TSH and RPR ordered
JdeJ6F=2.6  TSH and RPR WNL  Hepatic function panel and serum ammonia WNL on 1/15 all WNL
WBC on 2/12=3.15   WBC on 2/13=3.39  Dr. Davis aware to review labs
WBC on 2/12=3.15   WBC on 2/13=3.39  Dr. Davis aware to review labs

## 2019-02-15 NOTE — PROGRESS NOTE BEHAVIORAL HEALTH - PRN MEDS
Trazadone for sleep
Trazadone for sleep, benadryl
Trazadone for sleep
Trazadone for sleep
Tylenol, Trazadone hs with fair results
Trazadone for sleep
Trazadone for sleep, benadryl
Tylenol,
Tylenol, Trazadone hs
, benadryl
Tylenol, Trazadone hs with good results
Tylenol,
Trazadone for sleep, benadryl, motrin
, benadryl
Trazadone for sleep
PO Seroquel x 1 dose and PO trazodone for sleep x 1 dose

## 2019-02-15 NOTE — PROGRESS NOTE BEHAVIORAL HEALTH - THOUGHT CONTENT
Unremarkable
Suicidality/Unremarkable
Unremarkable
Suicidality/Unremarkable
Unremarkable
Unremarkable/Suicidality
Unremarkable/Suicidality
Suicidality/Unremarkable
Unremarkable/Suicidality
Unremarkable
Unremarkable/Suicidality
Suicidality/Unremarkable
Unremarkable
Other/Suicidality
Unremarkable

## 2019-02-15 NOTE — PROGRESS NOTE BEHAVIORAL HEALTH - AXIS III
HTN  HLD

## 2019-02-16 RX ADMIN — Medication 3 MILLIGRAM(S): at 20:34

## 2019-02-16 RX ADMIN — Medication 1 TABLET(S): at 08:40

## 2019-02-16 RX ADMIN — ATORVASTATIN CALCIUM 20 MILLIGRAM(S): 80 TABLET, FILM COATED ORAL at 20:34

## 2019-02-16 RX ADMIN — Medication 400 MILLIGRAM(S): at 14:18

## 2019-02-16 RX ADMIN — Medication 1 MILLIGRAM(S): at 08:40

## 2019-02-16 RX ADMIN — Medication 400 MILLIGRAM(S): at 15:00

## 2019-02-16 RX ADMIN — SERTRALINE 100 MILLIGRAM(S): 25 TABLET, FILM COATED ORAL at 08:40

## 2019-02-16 RX ADMIN — Medication 1 SPRAY(S): at 20:36

## 2019-02-16 RX ADMIN — Medication 2 GRAM(S): at 20:34

## 2019-02-16 RX ADMIN — Medication 2 GRAM(S): at 08:40

## 2019-02-16 RX ADMIN — Medication 1 SPRAY(S): at 08:40

## 2019-02-16 RX ADMIN — Medication 81 MILLIGRAM(S): at 08:40

## 2019-02-16 NOTE — PROGRESS NOTE BEHAVIORAL HEALTH - NSBHADMITMEDEDUDETAILS_A_CORE FT
No Psychoeducation provided re: need for Rx and aftercare and sobriety adherence for sx management and relapse prevention with teach back verbalized

## 2019-02-17 RX ADMIN — Medication 3 MILLIGRAM(S): at 21:07

## 2019-02-17 RX ADMIN — Medication 25 MILLIGRAM(S): at 03:16

## 2019-02-17 RX ADMIN — Medication 25 MILLIGRAM(S): at 15:25

## 2019-02-17 RX ADMIN — Medication 2 GRAM(S): at 21:07

## 2019-02-17 RX ADMIN — Medication 81 MILLIGRAM(S): at 08:13

## 2019-02-17 RX ADMIN — Medication 2 GRAM(S): at 08:13

## 2019-02-17 RX ADMIN — ATORVASTATIN CALCIUM 20 MILLIGRAM(S): 80 TABLET, FILM COATED ORAL at 21:07

## 2019-02-17 RX ADMIN — Medication 100 MILLIGRAM(S): at 21:07

## 2019-02-17 RX ADMIN — Medication 400 MILLIGRAM(S): at 17:41

## 2019-02-17 RX ADMIN — Medication 1 SPRAY(S): at 21:06

## 2019-02-17 RX ADMIN — SERTRALINE 100 MILLIGRAM(S): 25 TABLET, FILM COATED ORAL at 08:13

## 2019-02-17 RX ADMIN — Medication 1 TABLET(S): at 08:13

## 2019-02-17 RX ADMIN — Medication 400 MILLIGRAM(S): at 15:25

## 2019-02-17 RX ADMIN — Medication 1 MILLIGRAM(S): at 08:13

## 2019-02-18 PROCEDURE — 80053 COMPREHEN METABOLIC PANEL: CPT

## 2019-02-18 PROCEDURE — 80061 LIPID PANEL: CPT

## 2019-02-18 PROCEDURE — 85027 COMPLETE CBC AUTOMATED: CPT

## 2019-02-18 PROCEDURE — 80076 HEPATIC FUNCTION PANEL: CPT

## 2019-02-18 PROCEDURE — 84443 ASSAY THYROID STIM HORMONE: CPT

## 2019-02-18 PROCEDURE — 84100 ASSAY OF PHOSPHORUS: CPT

## 2019-02-18 PROCEDURE — 36415 COLL VENOUS BLD VENIPUNCTURE: CPT

## 2019-02-18 PROCEDURE — 80048 BASIC METABOLIC PNL TOTAL CA: CPT

## 2019-02-18 PROCEDURE — 94640 AIRWAY INHALATION TREATMENT: CPT

## 2019-02-18 PROCEDURE — 83036 HEMOGLOBIN GLYCOSYLATED A1C: CPT

## 2019-02-18 PROCEDURE — 82977 ASSAY OF GGT: CPT

## 2019-02-18 PROCEDURE — 86780 TREPONEMA PALLIDUM: CPT

## 2019-02-18 PROCEDURE — 83735 ASSAY OF MAGNESIUM: CPT

## 2019-02-18 PROCEDURE — 82140 ASSAY OF AMMONIA: CPT

## 2019-02-18 RX ADMIN — Medication 2 GRAM(S): at 08:40

## 2019-02-18 RX ADMIN — Medication 81 MILLIGRAM(S): at 08:39

## 2019-02-18 RX ADMIN — Medication 1 MILLIGRAM(S): at 08:39

## 2019-02-18 RX ADMIN — SERTRALINE 100 MILLIGRAM(S): 25 TABLET, FILM COATED ORAL at 08:38

## 2019-02-18 RX ADMIN — Medication 1 TABLET(S): at 08:38

## 2019-02-18 RX ADMIN — Medication 1 SPRAY(S): at 08:38

## 2019-04-09 NOTE — PROGRESS NOTE BEHAVIORAL HEALTH - HYGIENE
Per mom, child started with fevers yest 102-104 axillary.  Last tylenol 7am (7.5ml), last motrin today 10am (7.5ml).  Child c/o sore throat, mild cough.  Mild nausea, spits up phlegm.  Is urinating.  No diarrhea.      Tolerated smoothie this morning.  No appts available in clinic, referred here for strep testing.    Was vaccinated for influenza.  
Good

## 2019-05-22 PROBLEM — E78.5 HYPERLIPIDEMIA, UNSPECIFIED: Chronic | Status: ACTIVE | Noted: 2019-01-13

## 2019-05-29 ENCOUNTER — OUTPATIENT (OUTPATIENT)
Dept: OUTPATIENT SERVICES | Facility: HOSPITAL | Age: 44
LOS: 1 days | End: 2019-05-29
Payer: MEDICAID

## 2019-05-29 VITALS
HEIGHT: 66 IN | SYSTOLIC BLOOD PRESSURE: 116 MMHG | TEMPERATURE: 98 F | DIASTOLIC BLOOD PRESSURE: 83 MMHG | WEIGHT: 184.97 LBS | HEART RATE: 62 BPM | OXYGEN SATURATION: 99 % | RESPIRATION RATE: 16 BRPM

## 2019-05-29 DIAGNOSIS — N13.30 UNSPECIFIED HYDRONEPHROSIS: ICD-10-CM

## 2019-05-29 DIAGNOSIS — N20.0 CALCULUS OF KIDNEY: ICD-10-CM

## 2019-05-29 DIAGNOSIS — Z01.818 ENCOUNTER FOR OTHER PREPROCEDURAL EXAMINATION: ICD-10-CM

## 2019-05-29 DIAGNOSIS — N20.1 CALCULUS OF URETER: ICD-10-CM

## 2019-05-29 DIAGNOSIS — Z86.79 PERSONAL HISTORY OF OTHER DISEASES OF THE CIRCULATORY SYSTEM: Chronic | ICD-10-CM

## 2019-05-29 LAB
ALBUMIN SERPL ELPH-MCNC: 4 G/DL — SIGNIFICANT CHANGE UP (ref 3.3–5)
ALP SERPL-CCNC: 67 U/L — SIGNIFICANT CHANGE UP (ref 40–120)
ALT FLD-CCNC: 24 U/L — SIGNIFICANT CHANGE UP (ref 12–78)
ANION GAP SERPL CALC-SCNC: 4 MMOL/L — LOW (ref 5–17)
APPEARANCE UR: ABNORMAL
APTT BLD: 33.2 SEC — SIGNIFICANT CHANGE UP (ref 28.5–37)
AST SERPL-CCNC: 13 U/L — LOW (ref 15–37)
BILIRUB SERPL-MCNC: 0.4 MG/DL — SIGNIFICANT CHANGE UP (ref 0.2–1.2)
BILIRUB UR-MCNC: NEGATIVE — SIGNIFICANT CHANGE UP
BUN SERPL-MCNC: 23 MG/DL — SIGNIFICANT CHANGE UP (ref 7–23)
CALCIUM SERPL-MCNC: 8.9 MG/DL — SIGNIFICANT CHANGE UP (ref 8.5–10.1)
CHLORIDE SERPL-SCNC: 107 MMOL/L — SIGNIFICANT CHANGE UP (ref 96–108)
CO2 SERPL-SCNC: 30 MMOL/L — SIGNIFICANT CHANGE UP (ref 22–31)
COLOR SPEC: YELLOW — SIGNIFICANT CHANGE UP
CREAT SERPL-MCNC: 1.5 MG/DL — HIGH (ref 0.5–1.3)
DIFF PNL FLD: ABNORMAL
GLUCOSE SERPL-MCNC: 114 MG/DL — HIGH (ref 70–99)
GLUCOSE UR QL: NEGATIVE — SIGNIFICANT CHANGE UP
HCT VFR BLD CALC: 42.2 % — SIGNIFICANT CHANGE UP (ref 39–50)
HGB BLD-MCNC: 14 G/DL — SIGNIFICANT CHANGE UP (ref 13–17)
INR BLD: 0.91 RATIO — SIGNIFICANT CHANGE UP (ref 0.88–1.16)
KETONES UR-MCNC: NEGATIVE — SIGNIFICANT CHANGE UP
LEUKOCYTE ESTERASE UR-ACNC: ABNORMAL
MCHC RBC-ENTMCNC: 29.5 PG — SIGNIFICANT CHANGE UP (ref 27–34)
MCHC RBC-ENTMCNC: 33.2 GM/DL — SIGNIFICANT CHANGE UP (ref 32–36)
MCV RBC AUTO: 89 FL — SIGNIFICANT CHANGE UP (ref 80–100)
NITRITE UR-MCNC: NEGATIVE — SIGNIFICANT CHANGE UP
NRBC # BLD: 0 /100 WBCS — SIGNIFICANT CHANGE UP (ref 0–0)
PH UR: 5 — SIGNIFICANT CHANGE UP (ref 5–8)
PLATELET # BLD AUTO: 230 K/UL — SIGNIFICANT CHANGE UP (ref 150–400)
POTASSIUM SERPL-MCNC: 4.2 MMOL/L — SIGNIFICANT CHANGE UP (ref 3.5–5.3)
POTASSIUM SERPL-SCNC: 4.2 MMOL/L — SIGNIFICANT CHANGE UP (ref 3.5–5.3)
PROT SERPL-MCNC: 7.5 G/DL — SIGNIFICANT CHANGE UP (ref 6–8.3)
PROT UR-MCNC: NEGATIVE — SIGNIFICANT CHANGE UP
PROTHROM AB SERPL-ACNC: 10.4 SEC — SIGNIFICANT CHANGE UP (ref 10–12.9)
RBC # BLD: 4.74 M/UL — SIGNIFICANT CHANGE UP (ref 4.2–5.8)
RBC # FLD: 12.3 % — SIGNIFICANT CHANGE UP (ref 10.3–14.5)
SODIUM SERPL-SCNC: 141 MMOL/L — SIGNIFICANT CHANGE UP (ref 135–145)
SP GR SPEC: 1.02 — SIGNIFICANT CHANGE UP (ref 1.01–1.02)
UROBILINOGEN FLD QL: NEGATIVE — SIGNIFICANT CHANGE UP
WBC # BLD: 4.35 K/UL — SIGNIFICANT CHANGE UP (ref 3.8–10.5)
WBC # FLD AUTO: 4.35 K/UL — SIGNIFICANT CHANGE UP (ref 3.8–10.5)

## 2019-05-29 PROCEDURE — 85730 THROMBOPLASTIN TIME PARTIAL: CPT

## 2019-05-29 PROCEDURE — 80053 COMPREHEN METABOLIC PANEL: CPT

## 2019-05-29 PROCEDURE — 36415 COLL VENOUS BLD VENIPUNCTURE: CPT

## 2019-05-29 PROCEDURE — 81001 URINALYSIS AUTO W/SCOPE: CPT

## 2019-05-29 PROCEDURE — G0463: CPT

## 2019-05-29 PROCEDURE — 87086 URINE CULTURE/COLONY COUNT: CPT

## 2019-05-29 PROCEDURE — 71046 X-RAY EXAM CHEST 2 VIEWS: CPT

## 2019-05-29 PROCEDURE — 85610 PROTHROMBIN TIME: CPT

## 2019-05-29 PROCEDURE — 71046 X-RAY EXAM CHEST 2 VIEWS: CPT | Mod: 26

## 2019-05-29 PROCEDURE — 93010 ELECTROCARDIOGRAM REPORT: CPT | Mod: NC

## 2019-05-29 PROCEDURE — 93005 ELECTROCARDIOGRAM TRACING: CPT

## 2019-05-29 PROCEDURE — 85027 COMPLETE CBC AUTOMATED: CPT

## 2019-05-29 NOTE — H&P PST ADULT - HISTORY OF PRESENT ILLNESS
44 y/o male, PMH of HTN, Hyperlipidemia, depression, anxiety presents  to [facility] ER, with c/o of brown colored urine and left flank pain about 10 days ago. Had xray, sonogram done, was diagnosed with left kidney stone. Gave "medicine to break up the stone" (Tamsulosin 0.4mg and advised to follow up with the urologist. Was seen by Dr Hawkins last week, advised to have Left renal ESWL. Came for pre op testing today. 42 y/o male, PMH of HTN, Hyperlipidemia, depression, anxiety presents  to [facility] ER, with c/o of brown colored urine and left flank pain about 10 days ago. Had xray, sonogram done, was diagnosed with left kidney stone , hydronephrosis of left kidney. Gave "medicine to break up the stone" (Tamsulosin 0.4mg) for 7 days and advised to follow up with the urologist. Was seen by Dr Hawkins last week, advised to have Left renal ESWL. Came for pre op testing today.

## 2019-05-29 NOTE — H&P PST ADULT - ASSESSMENT
44 y/o male, PMH of HTN, Hyperlipidemia, depression, anxiety presents  to [facility] ER, with c/o of brown colored urine and left flank pain about 10 days ago. Had xray, sonogram done, was diagnosed with left kidney stone , hydronephrosis of left kidney. Gave "medicine to break up the stone" (Tamsulosin 0.4mg) for 7 days and advised to follow up with the urologist. Was seen by Dr Hawkins last week, advised to have Left renal ESWL. Came for pre op testing today.   Medical eval advised.

## 2019-05-29 NOTE — H&P PST ADULT - PSYCHIATRIC COMMENTS
In Jan 2019 , suicidal attempt, difficult divorce  and custody of children, feels ok now on therapy and meds controlled with meds In Jan 2019 , suicidal attempt took pills, was in house for a while now in rehab/ AA and psych consult/ said he had a difficult divorce  and custody of children, feels ok now on therapy and meds

## 2019-05-29 NOTE — H&P PST ADULT - NSICDXPASTMEDICALHX_GEN_ALL_CORE_FT
PAST MEDICAL HISTORY:  Anxiety     Depression     Hyperlipidemia, unspecified hyperlipidemia type     Hypertension

## 2019-05-29 NOTE — H&P PST ADULT - RS GEN PE MLT RESP DETAILS PC
respirations non-labored/good air movement/clear to auscultation bilaterally/normal/airway patent/breath sounds equal

## 2019-05-30 LAB
CULTURE RESULTS: NO GROWTH — SIGNIFICANT CHANGE UP
SPECIMEN SOURCE: SIGNIFICANT CHANGE UP

## 2019-06-04 ENCOUNTER — TRANSCRIPTION ENCOUNTER (OUTPATIENT)
Age: 44
End: 2019-06-04

## 2019-06-05 ENCOUNTER — OUTPATIENT (OUTPATIENT)
Dept: OUTPATIENT SERVICES | Facility: HOSPITAL | Age: 44
LOS: 1 days | End: 2019-06-05
Payer: MEDICAID

## 2019-06-05 VITALS
HEART RATE: 68 BPM | SYSTOLIC BLOOD PRESSURE: 127 MMHG | OXYGEN SATURATION: 98 % | DIASTOLIC BLOOD PRESSURE: 79 MMHG | RESPIRATION RATE: 16 BRPM

## 2019-06-05 VITALS
WEIGHT: 188.05 LBS | TEMPERATURE: 98 F | DIASTOLIC BLOOD PRESSURE: 84 MMHG | SYSTOLIC BLOOD PRESSURE: 138 MMHG | OXYGEN SATURATION: 96 % | HEART RATE: 57 BPM | RESPIRATION RATE: 16 BRPM | HEIGHT: 66 IN

## 2019-06-05 DIAGNOSIS — N13.30 UNSPECIFIED HYDRONEPHROSIS: ICD-10-CM

## 2019-06-05 DIAGNOSIS — Z86.79 PERSONAL HISTORY OF OTHER DISEASES OF THE CIRCULATORY SYSTEM: Chronic | ICD-10-CM

## 2019-06-05 DIAGNOSIS — Z01.818 ENCOUNTER FOR OTHER PREPROCEDURAL EXAMINATION: ICD-10-CM

## 2019-06-05 DIAGNOSIS — N20.1 CALCULUS OF URETER: ICD-10-CM

## 2019-06-05 PROCEDURE — 50590 FRAGMENTING OF KIDNEY STONE: CPT | Mod: LT

## 2019-06-05 RX ORDER — HYDROMORPHONE HYDROCHLORIDE 2 MG/ML
0.5 INJECTION INTRAMUSCULAR; INTRAVENOUS; SUBCUTANEOUS
Refills: 0 | Status: DISCONTINUED | OUTPATIENT
Start: 2019-06-05 | End: 2019-06-05

## 2019-06-05 RX ORDER — OXYCODONE HYDROCHLORIDE 5 MG/1
5 TABLET ORAL ONCE
Refills: 0 | Status: DISCONTINUED | OUTPATIENT
Start: 2019-06-05 | End: 2019-06-05

## 2019-06-05 RX ORDER — CEFAZOLIN SODIUM 1 G
2000 VIAL (EA) INJECTION ONCE
Refills: 0 | Status: DISCONTINUED | OUTPATIENT
Start: 2019-06-05 | End: 2019-06-05

## 2019-06-05 RX ORDER — SODIUM CHLORIDE 9 MG/ML
1000 INJECTION, SOLUTION INTRAVENOUS
Refills: 0 | Status: DISCONTINUED | OUTPATIENT
Start: 2019-06-05 | End: 2019-06-05

## 2019-06-05 RX ORDER — ONDANSETRON 8 MG/1
4 TABLET, FILM COATED ORAL ONCE
Refills: 0 | Status: DISCONTINUED | OUTPATIENT
Start: 2019-06-05 | End: 2019-06-05

## 2019-06-05 RX ADMIN — SODIUM CHLORIDE 100 MILLILITER(S): 9 INJECTION, SOLUTION INTRAVENOUS at 13:20

## 2019-06-05 NOTE — ASU DISCHARGE PLAN (ADULT/PEDIATRIC) - CALL YOUR DOCTOR IF YOU HAVE ANY OF THE FOLLOWING:
Pain not relieved by Medications/Fever greater than (need to indicate Fahrenheit or Celsius)/Unable to urinate/Swelling that gets worse/Bleeding that does not stop

## 2019-06-05 NOTE — ASU DISCHARGE PLAN (ADULT/PEDIATRIC) - COMMENTS
Follow up with Dr. Hawkins in the office in 3 weeks.  Once discharged, call the office to obtain a prescription for a KUB x-ray to be done prior to your appointment.

## 2019-06-05 NOTE — ASU DISCHARGE PLAN (ADULT/PEDIATRIC) - ASU DC SPECIAL INSTRUCTIONSFT
A small amount of blood in the urine after the procedure is normal.  If you experience large amounts of bleeding, call Dr. Hawkins or go to the ER immediately.

## 2019-06-05 NOTE — ASU DISCHARGE PLAN (ADULT/PEDIATRIC) - NURSING INSTRUCTIONS
Discharge instructions to include medications and its indications for use completed. Activity restrictions discussed. Signs/symptoms requiring immediate medical attention discussed. Infection prevention measures discussed. Patient  verbalized understanding of all teachings  and assures compliance.

## 2019-11-20 PROBLEM — Z00.00 ENCOUNTER FOR PREVENTIVE HEALTH EXAMINATION: Status: ACTIVE | Noted: 2019-11-20

## 2019-12-02 ENCOUNTER — APPOINTMENT (OUTPATIENT)
Dept: ORTHOPEDIC SURGERY | Facility: CLINIC | Age: 44
End: 2019-12-02
Payer: MEDICAID

## 2019-12-02 VITALS
SYSTOLIC BLOOD PRESSURE: 138 MMHG | WEIGHT: 198 LBS | DIASTOLIC BLOOD PRESSURE: 87 MMHG | HEIGHT: 66 IN | HEART RATE: 88 BPM | BODY MASS INDEX: 31.82 KG/M2

## 2019-12-02 DIAGNOSIS — Z86.39 PERSONAL HISTORY OF OTHER ENDOCRINE, NUTRITIONAL AND METABOLIC DISEASE: ICD-10-CM

## 2019-12-02 DIAGNOSIS — F10.11 ALCOHOL ABUSE, IN REMISSION: ICD-10-CM

## 2019-12-02 DIAGNOSIS — Z86.79 PERSONAL HISTORY OF OTHER DISEASES OF THE CIRCULATORY SYSTEM: ICD-10-CM

## 2019-12-02 DIAGNOSIS — F17.200 NICOTINE DEPENDENCE, UNSPECIFIED, UNCOMPLICATED: ICD-10-CM

## 2019-12-02 DIAGNOSIS — Z78.9 OTHER SPECIFIED HEALTH STATUS: ICD-10-CM

## 2019-12-02 PROCEDURE — 99203 OFFICE O/P NEW LOW 30 MIN: CPT

## 2019-12-03 PROBLEM — Z86.79 HISTORY OF HYPERTENSION: Status: RESOLVED | Noted: 2019-12-02 | Resolved: 2019-12-03

## 2019-12-03 PROBLEM — Z86.39 HISTORY OF HIGH CHOLESTEROL: Status: RESOLVED | Noted: 2019-12-02 | Resolved: 2019-12-03

## 2019-12-03 PROBLEM — F17.200 CURRENT SMOKER: Status: ACTIVE | Noted: 2019-12-02

## 2019-12-03 PROBLEM — F10.11 HISTORY OF ALCOHOL ABUSE: Status: RESOLVED | Noted: 2019-12-02 | Resolved: 2019-12-03

## 2019-12-03 PROBLEM — Z78.9 NEVER EXERCISES: Status: ACTIVE | Noted: 2019-12-02

## 2019-12-03 RX ORDER — ATORVASTATIN CALCIUM 10 MG/1
10 TABLET, FILM COATED ORAL
Refills: 0 | Status: ACTIVE | COMMUNITY

## 2019-12-03 RX ORDER — BUPROPION HYDROCHLORIDE 150 MG/1
150 TABLET, EXTENDED RELEASE ORAL
Refills: 0 | Status: ACTIVE | COMMUNITY

## 2019-12-03 RX ORDER — FOLIC ACID 1 MG/1
1 TABLET ORAL
Refills: 0 | Status: ACTIVE | COMMUNITY

## 2019-12-03 RX ORDER — ASPIRIN 81 MG
TABLET,CHEWABLE ORAL
Refills: 0 | Status: ACTIVE | COMMUNITY

## 2019-12-03 RX ORDER — AMLODIPINE BESYLATE 5 MG/1
5 TABLET ORAL
Refills: 0 | Status: ACTIVE | COMMUNITY

## 2019-12-03 RX ORDER — NAPROXEN 500 MG/1
500 TABLET ORAL
Refills: 0 | Status: ACTIVE | COMMUNITY

## 2019-12-03 RX ORDER — CHLORHEXIDINE GLUCONATE 4 %
5 LIQUID (ML) TOPICAL
Refills: 0 | Status: ACTIVE | COMMUNITY

## 2019-12-03 NOTE — HISTORY OF PRESENT ILLNESS
[Left] : left hand dominant [FreeTextEntry1] : He reports intermittent numbness and tingling in the radial digits. The left hand is worse than the right hand. This has been worsening for several months. The pain awakens him at night. Denies any clicking, locking, or triggering of digits. Denies any weakness. Denies any recent trauma. He has not worn braces at night in the past.  A recent EMG has been performed and the patient has been referred by his neurologist for further discussion. He works as a  and has chronic decreased sensation in his digits from previous desensitization exercises.\par

## 2019-12-03 NOTE — PHYSICAL EXAM
[de-identified] : Constitutional: Alert and in no acute distress.\par Psychiatric: Oriented to person, place, and time. Insight and judgement were intact and the affect was normal.\par Cardiovascular: Regular rate assessed through peripheral pulses.\par Pulmonary: Nonlabored breathing on room air.\par Lymphatics: No peripheral lymphadenopathy appreciated.\par \par Musculoskeletal:\par \par Cervical spine mobility is full in all directions. \par \par No skin changes are noted to the upper extremities. Muscle bulk and contour are within normal limits without evidence of atrophy.\par \par Mobility is full about the elbows with flexion and extension. Forearm supination measures 85/85 degrees. Forearm pronation measures 85/85 degrees. Wrist flexion measured 75/75 degrees. Wrist extension measures 65/65 degrees. Digital flexion and extension is full. Thumb opposition is full to the base of the small fingers bilaterally. Thumb abduction measures 5/5 in strength. Interosseous abduction measures 5/5 in strength. No cords or nodules are palpated. No triggering is observed. No tenderness over the thumb CMC articulations is observed. Scaphoid shift test is negative. Finkelstein test is negative. Scapholunate and lunotriquetral ballottement test are negative. Ulnar snuffbox tenderness is negative. Ulnar impingement test is negative. DRUJ piano key test is negative.\par \par Sensation is intact to light touch in the ulnar, median, and radial nerve distributions. Carpal tunnel provocative maneuvers are positive on the left. Cubital tunnel provocative maneuvers are negative. Vibratory sense is decreased on the left in the median distribution. Fingers are pink and well perfused. Capillary refill is brisk.\par \par Daquan II (R/L): 45/45\par \par  [de-identified] : EMG/NCS Results: Moderate left carpal tunnel syndrome and mild right carpal tunnel syndrome

## 2019-12-03 NOTE — ASSESSMENT
[FreeTextEntry1] : 44 year old male presents with left carpal tunnel syndrome. Treatment options were discussed, including operative and nonoperative treatment. At this time, the patient has opted for operative treatment. Risks, benefits and alternatives were discussed. Risks include but are not limited to the risks associated with anesthesia and the risks associated with the surgery. These include stiffness, need for additional procedures, damage to surrounding structures, infection, nonunion and malunion. I believe the patient understands these risks. The proposed procedure is left endoscopic carpal tunnel release. He will be scheduled at the next available date.

## 2020-01-14 ENCOUNTER — OUTPATIENT (OUTPATIENT)
Dept: OUTPATIENT SERVICES | Facility: HOSPITAL | Age: 45
LOS: 1 days | End: 2020-01-14
Payer: MEDICAID

## 2020-01-14 VITALS
SYSTOLIC BLOOD PRESSURE: 138 MMHG | WEIGHT: 205.03 LBS | DIASTOLIC BLOOD PRESSURE: 92 MMHG | TEMPERATURE: 98 F | HEIGHT: 65 IN | RESPIRATION RATE: 16 BRPM | HEART RATE: 87 BPM | OXYGEN SATURATION: 96 %

## 2020-01-14 DIAGNOSIS — Z98.52 VASECTOMY STATUS: Chronic | ICD-10-CM

## 2020-01-14 DIAGNOSIS — G56.00 CARPAL TUNNEL SYNDROME, UNSPECIFIED UPPER LIMB: ICD-10-CM

## 2020-01-14 DIAGNOSIS — Z86.79 PERSONAL HISTORY OF OTHER DISEASES OF THE CIRCULATORY SYSTEM: Chronic | ICD-10-CM

## 2020-01-14 DIAGNOSIS — G56.02 CARPAL TUNNEL SYNDROME, LEFT UPPER LIMB: ICD-10-CM

## 2020-01-14 DIAGNOSIS — Z98.890 OTHER SPECIFIED POSTPROCEDURAL STATES: Chronic | ICD-10-CM

## 2020-01-14 DIAGNOSIS — Z01.818 ENCOUNTER FOR OTHER PREPROCEDURAL EXAMINATION: ICD-10-CM

## 2020-01-14 LAB
ANION GAP SERPL CALC-SCNC: 15 MMOL/L — SIGNIFICANT CHANGE UP (ref 5–17)
BUN SERPL-MCNC: 16 MG/DL — SIGNIFICANT CHANGE UP (ref 7–23)
CALCIUM SERPL-MCNC: 9.6 MG/DL — SIGNIFICANT CHANGE UP (ref 8.4–10.5)
CHLORIDE SERPL-SCNC: 101 MMOL/L — SIGNIFICANT CHANGE UP (ref 96–108)
CO2 SERPL-SCNC: 24 MMOL/L — SIGNIFICANT CHANGE UP (ref 22–31)
CREAT SERPL-MCNC: 0.84 MG/DL — SIGNIFICANT CHANGE UP (ref 0.5–1.3)
GLUCOSE SERPL-MCNC: 146 MG/DL — HIGH (ref 70–99)
HCT VFR BLD CALC: 46.4 % — SIGNIFICANT CHANGE UP (ref 39–50)
HGB BLD-MCNC: 15.6 G/DL — SIGNIFICANT CHANGE UP (ref 13–17)
MCHC RBC-ENTMCNC: 29.8 PG — SIGNIFICANT CHANGE UP (ref 27–34)
MCHC RBC-ENTMCNC: 33.6 GM/DL — SIGNIFICANT CHANGE UP (ref 32–36)
MCV RBC AUTO: 88.5 FL — SIGNIFICANT CHANGE UP (ref 80–100)
PLATELET # BLD AUTO: 220 K/UL — SIGNIFICANT CHANGE UP (ref 150–400)
POTASSIUM SERPL-MCNC: 3.8 MMOL/L — SIGNIFICANT CHANGE UP (ref 3.5–5.3)
POTASSIUM SERPL-SCNC: 3.8 MMOL/L — SIGNIFICANT CHANGE UP (ref 3.5–5.3)
RBC # BLD: 5.24 M/UL — SIGNIFICANT CHANGE UP (ref 4.2–5.8)
RBC # FLD: 12.7 % — SIGNIFICANT CHANGE UP (ref 10.3–14.5)
SODIUM SERPL-SCNC: 140 MMOL/L — SIGNIFICANT CHANGE UP (ref 135–145)
WBC # BLD: 3.24 K/UL — LOW (ref 3.8–10.5)
WBC # FLD AUTO: 3.24 K/UL — LOW (ref 3.8–10.5)

## 2020-01-14 PROCEDURE — 80048 BASIC METABOLIC PNL TOTAL CA: CPT

## 2020-01-14 PROCEDURE — 85027 COMPLETE CBC AUTOMATED: CPT

## 2020-01-14 PROCEDURE — G0463: CPT

## 2020-01-14 RX ORDER — SODIUM CHLORIDE 9 MG/ML
3 INJECTION INTRAMUSCULAR; INTRAVENOUS; SUBCUTANEOUS EVERY 8 HOURS
Refills: 0 | Status: DISCONTINUED | OUTPATIENT
Start: 2020-01-21 | End: 2020-02-05

## 2020-01-14 RX ORDER — LIDOCAINE HCL 20 MG/ML
0.2 VIAL (ML) INJECTION ONCE
Refills: 0 | Status: DISCONTINUED | OUTPATIENT
Start: 2020-01-21 | End: 2020-02-05

## 2020-01-14 RX ORDER — SERTRALINE 25 MG/1
1 TABLET, FILM COATED ORAL
Qty: 0 | Refills: 0 | DISCHARGE

## 2020-01-14 RX ORDER — LOSARTAN POTASSIUM 100 MG/1
1 TABLET, FILM COATED ORAL
Qty: 0 | Refills: 0 | DISCHARGE

## 2020-01-14 NOTE — H&P PST ADULT - NSICDXPASTSURGICALHX_GEN_ALL_CORE_FT
PAST SURGICAL HISTORY:  H/O lithotripsy June 2019    H/O varicose veins S/P surgery    S/P vasectomy 2011

## 2020-01-14 NOTE — H&P PST ADULT - NSICDXPASTMEDICALHX_GEN_ALL_CORE_FT
PAST MEDICAL HISTORY:  Anxiety     Carpal tunnel syndrome Bilateral : Left > right    Depression     H/O ETOH abuse Last drink- January 2019    Hyperlipidemia, unspecified hyperlipidemia type     Hypertension     Kidney stones Left side    Obesity

## 2020-01-14 NOTE — H&P PST ADULT - NSICDXPROBLEM_GEN_ALL_CORE_FT
PROBLEM DIAGNOSES  Problem: Carpal tunnel syndrome  Assessment and Plan: Pt is scheduled for left endoscopic carpal tunnel release  NPO after 11PM except Bupropion and Amlodipine in AM DOS with sip of water

## 2020-01-14 NOTE — H&P PST ADULT - HISTORY OF PRESENT ILLNESS
Pt is a 44y.o. WM who presents with bilateral carpal tunnel syndrome - left > right. Pt states he is a  and he has been experiencing pain, numbness and tingling in his left hand/wrist. He is scheduled for left endoscopic carpal tunnel release with Dr. Casarez on 1/21/2020.

## 2020-01-20 ENCOUNTER — TRANSCRIPTION ENCOUNTER (OUTPATIENT)
Age: 45
End: 2020-01-20

## 2020-01-21 ENCOUNTER — OUTPATIENT (OUTPATIENT)
Dept: OUTPATIENT SERVICES | Facility: HOSPITAL | Age: 45
LOS: 1 days | End: 2020-01-21
Payer: MEDICAID

## 2020-01-21 ENCOUNTER — APPOINTMENT (OUTPATIENT)
Dept: ORTHOPEDIC SURGERY | Facility: HOSPITAL | Age: 45
End: 2020-01-21

## 2020-01-21 VITALS
RESPIRATION RATE: 18 BRPM | DIASTOLIC BLOOD PRESSURE: 83 MMHG | OXYGEN SATURATION: 97 % | HEART RATE: 80 BPM | SYSTOLIC BLOOD PRESSURE: 132 MMHG

## 2020-01-21 VITALS
HEIGHT: 65 IN | TEMPERATURE: 98 F | RESPIRATION RATE: 18 BRPM | DIASTOLIC BLOOD PRESSURE: 92 MMHG | OXYGEN SATURATION: 99 % | WEIGHT: 205.03 LBS | SYSTOLIC BLOOD PRESSURE: 143 MMHG | HEART RATE: 63 BPM

## 2020-01-21 DIAGNOSIS — G56.02 CARPAL TUNNEL SYNDROME, LEFT UPPER LIMB: ICD-10-CM

## 2020-01-21 DIAGNOSIS — Z98.52 VASECTOMY STATUS: Chronic | ICD-10-CM

## 2020-01-21 DIAGNOSIS — Z98.890 OTHER SPECIFIED POSTPROCEDURAL STATES: Chronic | ICD-10-CM

## 2020-01-21 DIAGNOSIS — Z86.79 PERSONAL HISTORY OF OTHER DISEASES OF THE CIRCULATORY SYSTEM: Chronic | ICD-10-CM

## 2020-01-21 PROCEDURE — 29848 WRIST ENDOSCOPY/SURGERY: CPT | Mod: LT

## 2020-01-21 RX ORDER — OXYCODONE HYDROCHLORIDE 5 MG/1
1 TABLET ORAL
Qty: 5 | Refills: 0
Start: 2020-01-21

## 2020-01-21 RX ORDER — OXYCODONE HYDROCHLORIDE 5 MG/1
5 TABLET ORAL ONCE
Refills: 0 | Status: DISCONTINUED | OUTPATIENT
Start: 2020-01-21 | End: 2020-01-21

## 2020-01-21 RX ORDER — ACETAMINOPHEN 500 MG
1000 TABLET ORAL ONCE
Refills: 0 | Status: COMPLETED | OUTPATIENT
Start: 2020-01-21 | End: 2020-01-21

## 2020-01-21 RX ORDER — BUPROPION HYDROCHLORIDE 150 MG/1
1 TABLET, EXTENDED RELEASE ORAL
Qty: 0 | Refills: 0 | DISCHARGE

## 2020-01-21 RX ORDER — AMLODIPINE BESYLATE 2.5 MG/1
1 TABLET ORAL
Qty: 0 | Refills: 0 | DISCHARGE

## 2020-01-21 RX ORDER — ONDANSETRON 8 MG/1
4 TABLET, FILM COATED ORAL ONCE
Refills: 0 | Status: DISCONTINUED | OUTPATIENT
Start: 2020-01-21 | End: 2020-02-05

## 2020-01-21 RX ORDER — CELECOXIB 200 MG/1
200 CAPSULE ORAL ONCE
Refills: 0 | Status: COMPLETED | OUTPATIENT
Start: 2020-01-21 | End: 2020-01-21

## 2020-01-21 RX ADMIN — CELECOXIB 200 MILLIGRAM(S): 200 CAPSULE ORAL at 07:21

## 2020-01-21 RX ADMIN — Medication 1000 MILLIGRAM(S): at 07:22

## 2020-01-21 NOTE — ASU PATIENT PROFILE, ADULT - PMH
Anxiety    Carpal tunnel syndrome  Bilateral : Left > right  Depression    H/O ETOH abuse  Last drink- January 2019  Hyperlipidemia, unspecified hyperlipidemia type    Hypertension    Kidney stones  Left side  Obesity

## 2020-01-21 NOTE — ASU DISCHARGE PLAN (ADULT/PEDIATRIC) - CARE PROVIDER_API CALL
Tami Casarez)  Orthopedics  [address] [place] [address], [address]  [place] [address]  Phone: [phone]  Fax: [phone]  Follow Up Time:

## 2020-01-31 ENCOUNTER — APPOINTMENT (OUTPATIENT)
Dept: ORTHOPEDIC SURGERY | Facility: CLINIC | Age: 45
End: 2020-01-31
Payer: MEDICAID

## 2020-01-31 DIAGNOSIS — G56.02 CARPAL TUNNEL SYNDROME, LEFT UPPER LIMB: ICD-10-CM

## 2020-01-31 PROBLEM — G56.00 CARPAL TUNNEL SYNDROME, UNSPECIFIED UPPER LIMB: Chronic | Status: ACTIVE | Noted: 2020-01-14

## 2020-01-31 PROBLEM — E66.9 OBESITY, UNSPECIFIED: Chronic | Status: ACTIVE | Noted: 2020-01-14

## 2020-01-31 PROBLEM — F10.11 ALCOHOL ABUSE, IN REMISSION: Chronic | Status: ACTIVE | Noted: 2020-01-14

## 2020-01-31 PROBLEM — N20.0 CALCULUS OF KIDNEY: Chronic | Status: ACTIVE | Noted: 2020-01-14

## 2020-01-31 PROCEDURE — 99024 POSTOP FOLLOW-UP VISIT: CPT

## 2020-02-14 ENCOUNTER — TRANSCRIPTION ENCOUNTER (OUTPATIENT)
Age: 45
End: 2020-02-14

## 2020-03-02 ENCOUNTER — APPOINTMENT (OUTPATIENT)
Dept: ORTHOPEDIC SURGERY | Facility: CLINIC | Age: 45
End: 2020-03-02
Payer: COMMERCIAL

## 2020-03-02 DIAGNOSIS — M65.352 TRIGGER FINGER, LEFT LITTLE FINGER: ICD-10-CM

## 2020-03-02 PROCEDURE — 99213 OFFICE O/P EST LOW 20 MIN: CPT | Mod: 25,24

## 2020-03-02 PROCEDURE — 20550 NJX 1 TENDON SHEATH/LIGAMENT: CPT | Mod: 79,F4

## 2020-03-02 NOTE — HISTORY OF PRESENT ILLNESS
[Doing Well] : is doing well [de-identified] : Date of Surgery: 1/21/20\par Procedure: left endoscopic carpal tunnel release [de-identified] : Denies any further night pain or numbness. REports pain and triggering of left small finger along with tenderness along volar forearm. [de-identified] : Injection provided today for left small finger. Return appointment will be scheduled for two weeks. \par \par After full discussion of treatment alternatives, and associated risks, complications, limitations, and expectations, and with patient consent, steroid injection was administered. Following sterile prep with sterile method, 1 cc 2% lidocaine and 6 mg celestone were injected into the left small finger A1 pulley without complication and was well tolerated.  [de-identified] : left upper extremity \par incision healed\par TTP A1 pulley of small finger with nodule\par full fist\par 5/5 thumb abd and interosseous\par SILT palm cut, vif, vsf, fdws\par WWP

## 2020-03-03 ENCOUNTER — APPOINTMENT (OUTPATIENT)
Dept: ORTHOPEDIC SURGERY | Facility: CLINIC | Age: 45
End: 2020-03-03
Payer: COMMERCIAL

## 2020-03-03 VITALS
HEIGHT: 66 IN | DIASTOLIC BLOOD PRESSURE: 92 MMHG | SYSTOLIC BLOOD PRESSURE: 150 MMHG | BODY MASS INDEX: 33.75 KG/M2 | HEART RATE: 72 BPM | WEIGHT: 210 LBS

## 2020-03-03 DIAGNOSIS — Z87.828 PERSONAL HISTORY OF OTHER (HEALED) PHYSICAL INJURY AND TRAUMA: ICD-10-CM

## 2020-03-03 PROCEDURE — 73564 X-RAY EXAM KNEE 4 OR MORE: CPT | Mod: LT

## 2020-03-03 PROCEDURE — 99215 OFFICE O/P EST HI 40 MIN: CPT | Mod: 24,25

## 2020-03-03 PROCEDURE — 20611 DRAIN/INJ JOINT/BURSA W/US: CPT | Mod: 79,LT

## 2020-03-03 RX ORDER — DICLOFENAC SODIUM 50 MG/1
50 TABLET, DELAYED RELEASE ORAL
Qty: 60 | Refills: 1 | Status: ACTIVE | COMMUNITY
Start: 2020-03-03 | End: 1900-01-01

## 2020-03-03 NOTE — HISTORY OF PRESENT ILLNESS
[de-identified] : CC Left knee\par \par HPI 45 yo male presents with gradual onset of 3-4 years of activity related pain in the anteromedial Left knee [without injury]. The pain is worse, and rated a 6 out of 10, described as achy and shooting, [without radiation]. Rest and medication makes the pain better and sleeping in certain positions and prolonged sitting makes the pain worse. The patient reports associated symptoms of pop click grinding weakness. The patient + pain at night affecting sleep, and + similar pain previously diagnosed with medial lateral meniscus tears and treated nonoperatively.\par \par The patient has tried the following treatments:\par Activity modification	+\par Ice/Compression  	+\par Braces    		+\par Nsaids    		+\par Physical Therapy 	+\par Cortisone Injection	-\par Visco Injection		-\par Zilretta			-\par Arthroscopy		-\par \par Review of Systems is positive for the above musculoskeletal symptoms and is otherwise non-contributory for general, constitutional, psychiatric, neurologic, HEENT, cardiac, respiratory, gastrointestinal, reproductive, lymphatic, and dermatologic complaints.\par \par Consult by \christiana \par

## 2020-03-03 NOTE — PHYSICAL EXAM
[de-identified] : Physical Examination\par General: well nourished, in no acute distress, alert and oriented to person, place and time\par Psychiatric: normal mood and affect, no abnormal movements or speech patterns\par Eyes: vision intact - glasses\par Throat: no thyromegaly\par Lymph: no enlarged nodes, no lymphedema in extremity\par Respiratory: no wheezing, no shortness of breath with ambulation\par Cardiac: no cardiac leg swelling, 2+ peripheral pulses\par Neurology: normal gross sensation in extremities to light touch\par Abdomen: soft, non-tender, tympanic, no masses\par \par Musculoskeletal Examination\par Ambulation	- antalgic gait, - assistive devices\par \par Knee			Right			Left\par General\par      Swelling/Deformity	normal			normal	\par      Skin			normal			normal\par      Erythema		-			-\par      Standing Alignment	neutral			neutral\par      Effusion		none			none\par Range of Motion\par      Hip			full painless ROM		full painless ROM\par      Knee Flexion		130			130\par      Knee Extension	0			0\par Patella\par      J Sign		-			-\par      Quad Medial/Lateral	1/1 1/1\par      Apprehension		-			-\par      Boo's		-			+\par      Grind Sign		-			+\par      Crepitus		-			+\par Palpation\par      Medial Joint Line	-			+\par      Medial Fem Condyle	-			+ artic\par      Lateral Joint Line	-			-\par      Quad Tendon		-			-\par      Patella Tendon	-			-\par      Medial Patella		-			-\par      Lateral Patella 	-			-\par      Posterior Knee	-			-\par Ligamentous\par      Varus @ 0° / 30°	-/-			-/-\par      Valgus @ 0° / 30°	-/-			-/-\par      Lachman		-			-\par      Pivot Shift		-			-\par      Anterior Drawer	-			-\par      Posterior Drawer	-			-\par Meniscus\par      Brandan		-			= medial\par      Flexion Pinch		-			+ anterior\par Strength Examination/Atrophy\par      Hip Flexors 		5+			5+\par      Quadriceps		5+			5+\par      Hamstring		5+			5+\par      Tibialis Anterior	5+			5+\par      Achilles/Soleus	5+			5+\par Sensation\par      Deep Peroneal	normal			normal\par      Superficial Peroneal 	normal			normal\par      Sural  		normal			normal\par      Posterior Tibial 	normal			normal\par      Saphneous 		normal			normal\par Pulses\par      DP			2+			2+\par  [de-identified] : 5 views of the affected Left knee (standing AP, flexing standing AP, 30degree flexed lateral, 0degree lateral, sunrise view)\par were ordered, obtained and evaluated by myself today and\par demonstrate:\par There is trace medial weightbearing asymmetric narrowing\par Trace osteophytic lipping\par no suprapatellar effusion\par Trace osteophytes with trace patellofemoral joint space loss without evidence of tilt [or] subluxation on sunrise view\par Normal soft tissue density\par Otherwise normal osseous bone structure without fracture or dislocation

## 2020-03-03 NOTE — DISCUSSION/SUMMARY
[de-identified] : Left knee chondromalacia\par Left knee patellofemoral syndrome\par Left knee history of medial lateral meniscus tear\par \par \par I discussed my findings on history, exam and radiology.\par \par I reviewed the anatomy and function of the periarticular muscles and tendons, patella, ligaments, menisci and cartilage of the knee using models, images and diagrams. Given the current findings for the patient, I recommend proceeding with non-operative management of the knee consisting of the following:\par \par Patient education about the knee motions causing pain and possibly injury for activity modification\par \par Avoid deep knee bends and squating to prevent exacerbating knee injury\par \par Ice or warm compress\par \par Physical therapy prescription with VMO/Hip Abductor/Lumbar Core strengthening, ROM stretching, mobilization, modalities, HEP\par \par The patient was prescribed Diclofenac PO non-steroidal anti-inflammatory medication. 50mg tablets twice daily to be taken for at least 1-2 weeks in a row and then PRN afterwards. Risks and benefits were discussed and include but not limited to renal damage and GI ulceration and bleeding.  They were advised to take with food to limit stomach upset as well as warned to stop the medication if worsening gastric pain or dizziness or other side effects. Also to immediately stop the medication and seek appropriate medical attention if any severe stomach ache, gastritis, black/red vomit, black/red stools or any other medical concern.\par \par Procedure Note:\par \par Verbal consent was obtained for an arthrocentesis and intra-articular corticosteroid injection of the LEFT knee, after the risks and benefits were discussed with the patient. Potential adverse effects were discussed including but not limited to bleeding, skin/joint infection, local skin reactions including bleaching, bruising, stiffness, soreness, vasovagal episodes, transient hyperglycemia, avascular necrosis, pseudo-septic type reactions, post injection joint pain, allergic reaction to product or anesthetic and other rare but potential adverse effects along with benefits including decreased pain and improved stability prior to obtaining verbal informed consent. It was also discussed that for some patients the treatment is ineffective and there are no guarantees that the patient will experience improvement as the result of the injection. In rare occasions the injection can cause worsening of pain.\par \par The use of a Sonosite 15-6 MHz linear transducer with live ultrasound guidance of the knee was necessary given the patient's BMI and local body habitus overlying and obscuring the accurate identification of normal body bony anatomy used to identify the injection site and the depth of soft tissue envelope necessitating a longer than normal needle to reach the joint space, and to confirm the location of the needle tip and intra-articular delivery of the medication. Without the use of live ultrasound guidance the injection would have been more difficult and place the patient's neurovascular structures at risk from the longer needle needed to traverse the soft tissue envelope.\par \par A 6 inch bolster was placed underneath the knee to place the knee in a slightly flexed position. The superolateral injection site was identified using the ultrasound probe to identify the suprapatellar space and superior boarder of the patella first longitudinally and then transversely. The injection site was marked and prepped with a ChloraPrep swab and anesthetized with ethylchloride skin anesthesia. Using sterile technique a 20g 3 1/2 in spinal needle with 3 cc total of 1cc 1% lidocaine without epinephrine, 1cc 0.25% Marcaine without epinephrine and 1cc of 40mg/mL Kenalog was passed through the injection site towards the suprapatellar space under live ultrasound guidance and noted to penetrate the joint capsule. The medication was injected without resistance under live ultrasound visualization and noted to flow into the suprapatellar joint space. The injection site was sterilely dressed, there was minimal blood loss. The patient tolerated this procedure without any complications done by myself. Images were recorded and saved.\par \par The patient has been advised that if they notice any worsening of symptoms or any problems to contact me and seek care from a qualified medical professional. The patient was instructed to ice the knee and take NSAID medication on an as needed basis if the patient feels discomfort.\par \par \par The patient verifies their understanding the the visit, diagnosis and plan. They agree with the treatment plan and will contact the office with any questions or problems.\par \par FU after PT completion if unimproved\par

## 2020-03-04 PROBLEM — G56.02 LEFT CARPAL TUNNEL SYNDROME: Status: ACTIVE | Noted: 2019-12-03

## 2020-03-04 NOTE — HISTORY OF PRESENT ILLNESS
[Doing Well] : is doing well [de-identified] : Denies any further night pain or numbness. Doing well post operatively [de-identified] : Date of Surgery: 1/21/20\par Procedure: left endoscopic carpal tunnel release [de-identified] : Return appointment will be scheduled for three weeks.  [de-identified] : left upper extremity \par incision c/d/i\par full fist\par 5/5 thumb abd and interosseous\par SILT palm cut, vif, vsf, fdws\par WWP

## 2020-03-23 NOTE — ASU DISCHARGE PLAN (ADULT/PEDIATRIC) - ***IN THE EVENT THAT YOU DEVELOP A COMPLICATION AND YOU ARE UNABLE TO REACH YOUR OWN PHYSICIAN, YOU MAY CONTACT:
Completed form received- scanned and mailed to home.  Called mom to inform her that forms were completed and mail Statement Selected

## 2020-04-02 NOTE — ED ADULT NURSE NOTE - PAIN: PRESENCE, MLM
Patient needs return to work letter please fax to his employer.  Please call patient for fax number.  Please also obtain his medical records from [facility] in [place] for his recent ER visit.  Also please schedule patient for video  visit in 4 weeks for diabetic check   complains of pain/discomfort

## 2020-05-15 ENCOUNTER — TRANSCRIPTION ENCOUNTER (OUTPATIENT)
Age: 45
End: 2020-05-15

## 2020-05-15 NOTE — ASU PATIENT PROFILE, ADULT - NSTOBACCO TYPE_GEN_A_CORE_RD
TRANSFER - OUT REPORT:    Verbal report given to [facility] on Severa Rumpf  being transferred to [phone] for routine post - op       Report consisted of patients Situation, Background, Assessment and   Recommendations(SBAR). Information from the following report(s) SBAR, OR Summary, Intake/Output, MAR and Recent Results was reviewed with the receiving nurse. Lines:   Peripheral IV 05/13/20 Left Arm (Active)   Site Assessment Clean, dry, & intact 5/15/2020  7:22 AM   Phlebitis Assessment 0 5/15/2020  7:22 AM   Infiltration Assessment 0 5/15/2020  7:22 AM   Dressing Status Clean, dry, & intact 5/15/2020  7:22 AM   Dressing Type Transparent 5/15/2020  7:22 AM   Hub Color/Line Status Infusing 5/15/2020  7:22 AM   Alcohol Cap Used No 5/14/2020 12:28 PM       Peripheral IV 05/14/20 Right Wrist (Active)   Site Assessment Clean, dry, & intact 5/15/2020  7:22 AM   Phlebitis Assessment 0 5/15/2020  7:22 AM   Infiltration Assessment 0 5/15/2020  7:22 AM   Dressing Status Clean, dry, & intact 5/15/2020  7:22 AM   Dressing Type Transparent 5/15/2020  7:22 AM   Hub Color/Line Status Capped 5/15/2020  7:22 AM   Alcohol Cap Used No 5/14/2020 12:28 PM        Opportunity for questions and clarification was provided. Patient transported with:   O2 @ 2 liters    VTE prophylaxis orders have been written for Severa Rumpf. Patient and family given floor number and nurses name. Family updated re: pt status after security code verified. Cigarettes

## 2020-06-02 ENCOUNTER — APPOINTMENT (OUTPATIENT)
Dept: ORTHOPEDIC SURGERY | Facility: CLINIC | Age: 45
End: 2020-06-02
Payer: COMMERCIAL

## 2020-06-02 VITALS
BODY MASS INDEX: 33.75 KG/M2 | HEIGHT: 66 IN | HEART RATE: 76 BPM | WEIGHT: 210 LBS | SYSTOLIC BLOOD PRESSURE: 147 MMHG | DIASTOLIC BLOOD PRESSURE: 90 MMHG | TEMPERATURE: 97.6 F

## 2020-06-02 DIAGNOSIS — M23.92 UNSPECIFIED INTERNAL DERANGEMENT OF LEFT KNEE: ICD-10-CM

## 2020-06-02 PROCEDURE — 99214 OFFICE O/P EST MOD 30 MIN: CPT

## 2020-06-02 NOTE — HISTORY OF PRESENT ILLNESS
[de-identified] : CC Left knee\par \par HPI 43 yo male presents for CSI, NSAIDS and PT FU of gradual onset of 3-4 years of activity related pain in the anteromedial Left knee [without injury]. The pain is worse, and rated a 6 out of 10, described as achy and shooting, [without radiation]. Rest and medication makes the pain better and sleeping in certain positions and prolonged sitting makes the pain worse. The patient reports associated symptoms of pop click grinding weakness. The patient + pain at night affecting sleep, and + similar pain previously diagnosed with medial lateral meniscus tears and treated nonoperatively.\par \par The patient has tried the following treatments:\par Activity modification	+\par Ice/Compression  	+\par Braces    		+\par Nsaids    		+\par Physical Therapy 	+ some help but back to baseline\par Cortisone Injection	+ some help 1 month but back to baseline\par Visco Injection		-\par Zilretta			-\par Arthroscopy		-\par \par medial joint line pain back to baseline\par \par Review of Systems is positive for the above musculoskeletal symptoms and is otherwise non-contributory for general, constitutional, psychiatric, neurologic, HEENT, cardiac, respiratory, gastrointestinal, reproductive, lymphatic, and dermatologic complaints.\par \par Consult by \christiana \christiana

## 2020-06-02 NOTE — PHYSICAL EXAM
[de-identified] : Physical Examination\par General: well nourished, in no acute distress, alert and oriented to person, place and time\par Psychiatric: normal mood and affect, no abnormal movements or speech patterns\par Eyes: vision intact - glasses\par Throat: no thyromegaly\par Lymph: no enlarged nodes, no lymphedema in extremity\par Respiratory: no wheezing, no shortness of breath with ambulation\par Cardiac: no cardiac leg swelling, 2+ peripheral pulses\par Neurology: normal gross sensation in extremities to light touch\par Abdomen: soft, non-tender, tympanic, no masses\par \par Musculoskeletal Examination\par Ambulation	- antalgic gait, - assistive devices\par \par Knee			Right			Left\par General\par      Swelling/Deformity	normal			normal	\par      Skin			normal			normal\par      Erythema		-			-\par      Standing Alignment	neutral			neutral\par      Effusion		none			none\par Range of Motion\par      Hip			full painless ROM		full painless ROM\par      Knee Flexion		130			130\par      Knee Extension	0			0\par Patella\par      J Sign		-			-\par      Quad Medial/Lateral	1/1 1/1\par      Apprehension		-			-\par      Boo's		-			+\par      Grind Sign		-			+\par      Crepitus		-			+\par Palpation\par      Medial Joint Line	-			+\par      Medial Fem Condyle	-			+ artic\par      Lateral Joint Line	-			-\par      Quad Tendon		-			+\par      Patella Tendon	-			-\par      Medial Patella		-			-\par      Lateral Patella 	-			-\par      Posterior Knee	-			-\par Ligamentous\par      Varus @ 0° / 30°	-/-			-/-\par      Valgus @ 0° / 30°	-/-			-/-\par      Lachman		-			-\par      Pivot Shift		-			-\par      Anterior Drawer	-			-\par      Posterior Drawer	-			-\par Meniscus\par      Brandan		-			+ medial\par      Flexion Pinch		-			+ anterior\par Strength Examination/Atrophy\par      Hip Flexors 		5+			5+\par      Quadriceps		5+			5+\par      Hamstring		5+			5+\par      Tibialis Anterior	5+			5+\par      Achilles/Soleus	5+			5+\par Sensation\par      Deep Peroneal	normal			normal\par      Superficial Peroneal 	normal			normal\par      Sural  		normal			normal\par      Posterior Tibial 	normal			normal\par      Saphneous 		normal			normal\par Pulses\par      DP			2+			2+\par  [de-identified] : 5 views of the affected Left knee (standing AP, flexing standing AP, 30degree flexed lateral, 0degree lateral, sunrise view)\par 3-3-20\par demonstrate:\par There is trace medial weightbearing asymmetric narrowing\par Trace osteophytic lipping\par no suprapatellar effusion\par Trace osteophytes with trace patellofemoral joint space loss without evidence of tilt [or] subluxation on sunrise view\par Normal soft tissue density\par Otherwise normal osseous bone structure without fracture or dislocation

## 2020-06-02 NOTE — DISCUSSION/SUMMARY
[de-identified] : Left knee chondromalacia\par Left knee patellofemoral syndrome\par Left knee history of medial lateral meniscus tear\par Left knee internal derangement\par \par I discussed my findings on history, exam and radiology.\par \par I reviewed the anatomy and function of the periarticular muscles and tendons, patella, ligaments, menisci and cartilage of the knee using models, images and diagrams. Given the current findings for the patient, I recommend proceeding with advanced imaging to better characterize and diagnose the problem to aid patient care, management and treatment guidance as I suspect an internal injury to the knee not diagnosed on non-diagnostic plain radiographs causing the patients symptoms and physical examination to help provide surgical management planning.\par \par Therefore given the patients continued symptoms despite [ non-operative management ] with continued pain affecting ADLs and inability to do activities limiting quality of life as well as locking and instability significant for patient falls potentially placing the patient at increased risk for exacerbating the injury or causing further injury to the knee, an examination and history consistent with injury to an internal knee derangement and a non-diagnostic radiograph I recommend obtaining an MRI to assess the knee's internal structures for preoperative planning. I discussed with the patient that I am ordering an MRI to assess the soft tissue structures in the joint such as ligaments and tendons, as well as to assess the cartilage and menisci as well as for any bony edema. The test will need insurance approval and will take place at a [facility] or outside facility. If the patient elects to obtain the MRI from an outside facility, the patient understands they have been instructed to bring in both the final radiologist read and a CD/DVD with the MRI images to allow review of the imaging test by myself during the follow up visit. I do not recommend obtaining an open standing MRI as the quality of the images is subpar and makes it difficult to diagnose intra-articular derangements and guide care discussion and decision making.\par \par \par PRN prescribed Diclofenac PO non-steroidal anti-inflammatory medication. 50mg tablets twice daily to be taken for at least 1-2 weeks in a row and then PRN afterwards. Risks and benefits were discussed and include but not limited to renal damage and GI ulceration and bleeding.  They were advised to take with food to limit stomach upset as well as warned to stop the medication if worsening gastric pain or dizziness or other side effects. Also to immediately stop the medication and seek appropriate medical attention if any severe stomach ache, gastritis, black/red vomit, black/red stools or any other medical concern.\par \par The patient verifies their understanding the the visit, diagnosis and plan. They agree with the treatment plan and will contact the office with any questions or problems.\par \par FU after MRI is obtained\par \par \par

## 2020-06-11 ENCOUNTER — TRANSCRIPTION ENCOUNTER (OUTPATIENT)
Age: 45
End: 2020-06-11

## 2020-06-12 ENCOUNTER — OUTPATIENT (OUTPATIENT)
Dept: OUTPATIENT SERVICES | Facility: HOSPITAL | Age: 45
LOS: 1 days | End: 2020-06-12

## 2020-06-12 ENCOUNTER — APPOINTMENT (OUTPATIENT)
Dept: MRI IMAGING | Facility: IMAGING CENTER | Age: 45
End: 2020-06-12

## 2020-06-12 DIAGNOSIS — M94.262 CHONDROMALACIA, LEFT KNEE: ICD-10-CM

## 2020-06-12 DIAGNOSIS — Z86.79 PERSONAL HISTORY OF OTHER DISEASES OF THE CIRCULATORY SYSTEM: Chronic | ICD-10-CM

## 2020-06-12 DIAGNOSIS — Z98.52 VASECTOMY STATUS: Chronic | ICD-10-CM

## 2020-06-12 DIAGNOSIS — M76.899 OTHER SPECIFIED ENTHESOPATHIES OF UNSPECIFIED LOWER LIMB, EXCLUDING FOOT: ICD-10-CM

## 2020-06-12 DIAGNOSIS — Z98.890 OTHER SPECIFIED POSTPROCEDURAL STATES: Chronic | ICD-10-CM

## 2020-06-12 DIAGNOSIS — M23.92 UNSPECIFIED INTERNAL DERANGEMENT OF LEFT KNEE: ICD-10-CM

## 2020-06-12 DIAGNOSIS — M22.2X2 PATELLOFEMORAL DISORDERS, LEFT KNEE: ICD-10-CM

## 2020-06-17 ENCOUNTER — APPOINTMENT (OUTPATIENT)
Dept: RADIOLOGY | Facility: IMAGING CENTER | Age: 45
End: 2020-06-17
Payer: COMMERCIAL

## 2020-06-17 ENCOUNTER — APPOINTMENT (OUTPATIENT)
Dept: MRI IMAGING | Facility: IMAGING CENTER | Age: 45
End: 2020-06-17
Payer: COMMERCIAL

## 2020-06-17 ENCOUNTER — OUTPATIENT (OUTPATIENT)
Dept: OUTPATIENT SERVICES | Facility: HOSPITAL | Age: 45
LOS: 1 days | End: 2020-06-17
Payer: COMMERCIAL

## 2020-06-17 DIAGNOSIS — M94.262 CHONDROMALACIA, LEFT KNEE: ICD-10-CM

## 2020-06-17 DIAGNOSIS — M23.92 UNSPECIFIED INTERNAL DERANGEMENT OF LEFT KNEE: ICD-10-CM

## 2020-06-17 DIAGNOSIS — M22.2X2 PATELLOFEMORAL DISORDERS, LEFT KNEE: ICD-10-CM

## 2020-06-17 DIAGNOSIS — M76.899 OTHER SPECIFIED ENTHESOPATHIES OF UNSPECIFIED LOWER LIMB, EXCLUDING FOOT: ICD-10-CM

## 2020-06-17 DIAGNOSIS — Z98.52 VASECTOMY STATUS: Chronic | ICD-10-CM

## 2020-06-17 DIAGNOSIS — Z86.79 PERSONAL HISTORY OF OTHER DISEASES OF THE CIRCULATORY SYSTEM: Chronic | ICD-10-CM

## 2020-06-17 DIAGNOSIS — Z98.890 OTHER SPECIFIED POSTPROCEDURAL STATES: Chronic | ICD-10-CM

## 2020-06-17 PROCEDURE — 73721 MRI JNT OF LWR EXTRE W/O DYE: CPT

## 2020-06-17 PROCEDURE — 73721 MRI JNT OF LWR EXTRE W/O DYE: CPT | Mod: 26,LT

## 2020-06-29 ENCOUNTER — APPOINTMENT (OUTPATIENT)
Dept: ORTHOPEDIC SURGERY | Facility: CLINIC | Age: 45
End: 2020-06-29
Payer: COMMERCIAL

## 2020-06-29 VITALS
TEMPERATURE: 98 F | HEART RATE: 81 BPM | SYSTOLIC BLOOD PRESSURE: 129 MMHG | DIASTOLIC BLOOD PRESSURE: 80 MMHG | WEIGHT: 210 LBS | HEIGHT: 66 IN | BODY MASS INDEX: 33.75 KG/M2

## 2020-06-29 DIAGNOSIS — G56.22 LESION OF ULNAR NERVE, LEFT UPPER LIMB: ICD-10-CM

## 2020-06-29 PROCEDURE — 99214 OFFICE O/P EST MOD 30 MIN: CPT

## 2020-06-30 ENCOUNTER — APPOINTMENT (OUTPATIENT)
Dept: ORTHOPEDIC SURGERY | Facility: CLINIC | Age: 45
End: 2020-06-30
Payer: COMMERCIAL

## 2020-06-30 VITALS
SYSTOLIC BLOOD PRESSURE: 133 MMHG | DIASTOLIC BLOOD PRESSURE: 82 MMHG | WEIGHT: 208 LBS | HEART RATE: 80 BPM | BODY MASS INDEX: 33.43 KG/M2 | HEIGHT: 66 IN

## 2020-06-30 VITALS — TEMPERATURE: 94.8 F

## 2020-06-30 DIAGNOSIS — M76.899 OTHER SPECIFIED ENTHESOPATHIES OF UNSPECIFIED LOWER LIMB, EXCLUDING FOOT: ICD-10-CM

## 2020-06-30 DIAGNOSIS — M94.262 CHONDROMALACIA, LEFT KNEE: ICD-10-CM

## 2020-06-30 DIAGNOSIS — M22.2X2 PATELLOFEMORAL DISORDERS, LEFT KNEE: ICD-10-CM

## 2020-06-30 PROCEDURE — 99214 OFFICE O/P EST MOD 30 MIN: CPT

## 2020-06-30 RX ORDER — LORATADINE 10 MG/1
10 TABLET ORAL
Qty: 30 | Refills: 0 | Status: ACTIVE | COMMUNITY
Start: 2020-06-19

## 2020-06-30 RX ORDER — AZITHROMYCIN 250 MG/1
250 TABLET, FILM COATED ORAL
Qty: 6 | Refills: 0 | Status: ACTIVE | COMMUNITY
Start: 2020-04-27

## 2020-06-30 RX ORDER — IBUPROFEN 600 MG/1
600 TABLET, FILM COATED ORAL
Qty: 30 | Refills: 0 | Status: ACTIVE | COMMUNITY
Start: 2020-04-03

## 2020-06-30 RX ORDER — BUPROPION HYDROCHLORIDE 100 MG/1
100 TABLET, FILM COATED, EXTENDED RELEASE ORAL
Qty: 30 | Refills: 0 | Status: ACTIVE | COMMUNITY
Start: 2020-06-16

## 2020-06-30 RX ORDER — LOSARTAN POTASSIUM 50 MG/1
50 TABLET, FILM COATED ORAL
Qty: 90 | Refills: 0 | Status: ACTIVE | COMMUNITY
Start: 2020-03-04

## 2020-06-30 RX ORDER — DOXYCYCLINE HYCLATE 100 MG/1
100 CAPSULE ORAL
Qty: 20 | Refills: 0 | Status: ACTIVE | COMMUNITY
Start: 2020-04-27

## 2020-06-30 RX ORDER — DICLOFENAC SODIUM 10 MG/G
1 GEL TOPICAL
Qty: 100 | Refills: 2 | Status: ACTIVE | COMMUNITY
Start: 2020-06-30 | End: 1900-01-01

## 2020-06-30 RX ORDER — KETOCONAZOLE 20 MG/G
2 CREAM TOPICAL
Qty: 60 | Refills: 0 | Status: ACTIVE | COMMUNITY
Start: 2020-03-31

## 2020-06-30 RX ORDER — BUPROPION HYDROCHLORIDE 150 MG/1
150 TABLET, EXTENDED RELEASE ORAL
Qty: 28 | Refills: 0 | Status: ACTIVE | COMMUNITY
Start: 2020-02-11

## 2020-06-30 RX ORDER — BENZONATATE 100 MG/1
100 CAPSULE ORAL
Qty: 30 | Refills: 0 | Status: ACTIVE | COMMUNITY
Start: 2020-04-27

## 2020-06-30 RX ORDER — ALBUTEROL SULFATE 90 UG/1
108 (90 BASE) INHALANT RESPIRATORY (INHALATION)
Qty: 7 | Refills: 0 | Status: ACTIVE | COMMUNITY
Start: 2020-04-25

## 2020-06-30 RX ORDER — LOSARTAN POTASSIUM 25 MG/1
25 TABLET, FILM COATED ORAL
Qty: 180 | Refills: 0 | Status: ACTIVE | COMMUNITY
Start: 2020-05-30

## 2020-06-30 NOTE — DISCUSSION/SUMMARY
[de-identified] : Left knee chondromalacia\par Left knee patellofemoral syndrome\par Left knee history of medial lateral meniscus tear, not seen recent MRI\par \par MRIs reviewed at length\par \par Patellofemoral syndrome or chondritis or chondromalacia is a spectrum of disease of the cartilage in the joint between the patella, or knee cap, and the femoral trochlea, or thigh bone ranging from overload of the joint, to irritation of the cartilage and finally wear of the cartilage. The patella has a convex v shaped joint surface and the trochlea a matching concave v shape. The patella runs in the trochlea valley as the knee bends, increasing the leverage and allowing knee range of motion. However the patellofemoral joint experiences 20-50x the body weight in force when going up and down stairs during mid-flexion of the knee. The joint is most symptomatic with prolonged knee flexion or going up/down stairs. The treatment for this problem is predominantly non-operative consisting of patient education, activity modification, physical therapy with focus on VMO strengthening, oral and topical non-steroidal anti-inflammatories, knee braces including lateral J/shield's brace or infrapatellar band, and corticosteroid/viscosupplementation injection. The symptoms are chronic and are difficult to treat. In cases of failure to respond to non-operative management surgical options can be considered.\par \par Physical therapy prescribed with knee ROM exercises, quad/hamstring/VMO/Hip abductor strengthening, knee taping, patella mobilization, modalities PRN, and home exercise program.\par \par The patient was prescribed Diclofenac topical liquid/creme non-steroidal anti-inflammatory medication. 1-2 pumps twice daily and apply to area with pain. There is low systemic absorption of the medication but risks while reduced remain were discussed and include but not limited to renal damage and GI ulceration and bleeding. They were warned to stop the medication if worsening buring skin or gastric pain or dizziness or other side effects. Also to immediately stop the medication and seek appriate medical attention if any severe stomach ache, gastritis, black/red vomit, black/red stools or any other medical concern.\par \par The patient verifies their understanding the the visit, diagnosis and plan. They agree with the treatment plan and will contact the office with any questions or problems.\par \par Follow up\par PRN\par

## 2020-06-30 NOTE — PHYSICAL EXAM
[de-identified] : Physical Examination\par General: well nourished, in no acute distress, alert and oriented to person, place and time\par Psychiatric: normal mood and affect, no abnormal movements or speech patterns\par Eyes: vision intact - glasses\par Throat: no thyromegaly\par Lymph: no enlarged nodes, no lymphedema in extremity\par Respiratory: no wheezing, no shortness of breath with ambulation\par Cardiac: no cardiac leg swelling, 2+ peripheral pulses\par Neurology: normal gross sensation in extremities to light touch\par Abdomen: soft, non-tender, tympanic, no masses\par \par Musculoskeletal Examination\par Ambulation	- antalgic gait, - assistive devices\par \par Knee			Right			Left\par General\par      Swelling/Deformity	normal			normal	\par      Skin			normal			normal\par      Erythema		-			-\par      Standing Alignment	neutral			neutral\par      Effusion		none			none\par Range of Motion\par      Hip			full painless ROM		full painless ROM\par      Knee Flexion		130			130\par      Knee Extension	0			0\par Patella\par      J Sign		-			-\par      Quad Medial/Lateral	1/1 1/1\par      Apprehension		-			-\par      Boo's		-			+\par      Grind Sign		-			+\par      Crepitus		-			+\par Palpation\par      Medial Joint Line	-			-\par      Medial Fem Condyle	-			-\par      Lateral Joint Line	-			-\par      Quad Tendon		-			+\par      Patella Tendon	-			-\par      Medial Patella		-			-\par      Lateral Patella 	-			-\par      Posterior Knee	-			-\par Ligamentous\par      Varus @ 0° / 30°	-/-			-/-\par      Valgus @ 0° / 30°	-/-			-/-\par      Lachman		-			-\par      Pivot Shift		-			-\par      Anterior Drawer	-			-\par      Posterior Drawer	-			-\par Meniscus\par      Brandan		-			-\par      Flexion Pinch		-			+ anterior\par Strength Examination/Atrophy\par      Hip Flexors 		5+			5+\par      Quadriceps		5+			5+\par      Hamstring		5+			5+\par      Tibialis Anterior	5+			5+\par      Achilles/Soleus	5+			5+\par Sensation\par      Deep Peroneal	normal			normal\par      Superficial Peroneal 	normal			normal\par      Sural  		normal			normal\par      Posterior Tibial 	normal			normal\par      Saphneous 		normal			normal\par Pulses\par      DP			2+			2+\par  [de-identified] : 5 views of the affected Left knee (standing AP, flexing standing AP, 30degree flexed lateral, 0degree lateral, sunrise view)\par 3-3-20\par demonstrate:\par There is trace medial weightbearing asymmetric narrowing\par Trace osteophytic lipping\par no suprapatellar effusion\par Trace osteophytes with trace patellofemoral joint space loss without evidence of tilt [or] subluxation on sunrise view\par Normal soft tissue density\par Otherwise normal osseous bone structure without fracture or dislocation\par \par \par MRI Left knee from [facility] on 6-17-20\par My impression of the images:\par Quality of the MRI is good\par Medial Meniscus ok\par Lateral Meniscus ok\par There is mild focal chondral loss in the patellofemoral compartments\par There is [Not] bone marrow edema[/subchondral cysts] in the tricompartments\par LCL is intact\par MCL is intact\par ACL is intact\par PCL is intact \par Quadriceps Tendon is intact with trace signal\par Patella Tendon is intact\par \par The Final Radiologist Impression:\par OSSEOUS STRUCTURES \par Fractures/Contusions: None. \par \par LIGAMENTS AND TENDONS \par Anterior Cruciate Ligament: Intact. \par Posterior Cruciate Ligament: Intact. \par Distal Quadriceps Tendon: Intact. \par Patellar Tendon: Intact. \par Patellar Retinaculum: Intact. \par Medial Collateral Ligament: Intact. \par Posterolateral Corner Structures: Intact. \par Iliotibial Band: Unremarkable. \par \par MEDIAL COMPARTMENT \par Medial Meniscus: Intact. \par Articular Cartilage: Preserved. \par \par LATERAL COMPARTMENT \par Lateral Meniscus: Intact. \par Articular Cartilage: Preserved. \par \par PATELLOFEMORAL COMPARTMENT \par Articular Cartilage: Small focus of grade I chondromalacia is present \par along the medial patellar facet. \par Patellar Alignment: Maintained. \par \par JOINT CAPSULE \par Effusion/Synovitis: None. \par Intra-articular Bodies: None. \par \par PERIARTICULAR SOFT TISSUES \par Baker's Cyst: A tiny Baker's cyst is noted. \par Periarticular Cysts: None. \par Musculature: Preserved. \par Neurovascular Structures: Preserved. \par Subcutaneous Tissues: Slight prepatellar edema is noted. \par Other Findings: Borderline sized 1.5 x 1.0 cm popliteal lymph node is \par noted. \par \par IMPRESSION: \par 1. Menisci appear intact. \par 2. Small focus of grade I chondromalacia is present along the medial \par patellar facet. \par 3. Tiny Choi's cyst is noted.

## 2020-06-30 NOTE — HISTORY OF PRESENT ILLNESS
[de-identified] : CC Left knee\par \par HPI 45 yo male presents for MRI FU of gradual onset of 3-4 years of activity related pain in the anteromedial Left knee [without injury]. The pain is worse, and rated a 6 out of 10, described as achy and shooting, [without radiation]. Rest and medication makes the pain better and sleeping in certain positions and prolonged sitting makes the pain worse. The patient reports associated symptoms of pop click grinding weakness. The patient + pain at night affecting sleep, and + similar pain previously diagnosed with medial lateral meniscus tears and treated nonoperatively.\par \par The patient has tried the following treatments:\par Activity modification	+\par Ice/Compression  	+\par Braces    		+\par Nsaids    		+\par Physical Therapy 	+ some help but back to baseline\par Cortisone Injection	+ some help 1 month but back to baseline\par Visco Injection		-\par Zilretta			-\par Arthroscopy		-\par \par pain only proximal now, >50% beytter\par \par Review of Systems is positive for the above musculoskeletal symptoms and is otherwise non-contributory for general, constitutional, psychiatric, neurologic, HEENT, cardiac, respiratory, gastrointestinal, reproductive, lymphatic, and dermatologic complaints.\par \par Consult by \[email or web address] \[email or web address]

## 2021-11-19 NOTE — H&P PST ADULT - OPHTHALMOLOGIC
Vaccine Information Statement(s) or the Emergency Use Authorization was given today. This has been reviewed, questions answered, and verbal consent given by Patient for injection(s) and administration of Influenza (Inactivated) and Shingles.      Patient tolerated without incident. See immunization grid for documentation.         negative

## 2021-12-23 ENCOUNTER — TRANSCRIPTION ENCOUNTER (OUTPATIENT)
Age: 46
End: 2021-12-23

## 2023-01-13 NOTE — ASU PATIENT PROFILE, ADULT - DEVELOP COPING SKILLS. FOR EXAMPLE, STRATEGIES AND LIFESTYLE CHANGES THAT REDUCE NEGATIVE MOODS, STRESS, AND EXPOSURE TO SMOKING CUES
[Normal Development] : Normal Development [None] : none [Urinates in a potty or toilet] : urinates in a potty or toilet [Plays pretend with toys or dolls] : plays pretend with toys or dolls [Pokes food with fork] : pokes food with fork [Explains the reason for things,] : explains the reason for things, such as needing a sweater when it's cold [Names at least one color] : names at least one color [Walks up steps, using one] : walks up steps, using one foot, then the other [Runs well without falling] : runs well without falling [Catches a large ball] : catches a large ball [Copies a vertical line] : copies a vertical line [FreeTextEntry1] : Starting to potty train Statement Selected

## 2023-04-11 NOTE — H&P PST ADULT - NSWEIGHTCALCTOOLDRUG_GEN_A_CORE
Well controlled on exam, 120/78. On Losartan 25 mg daily, metoprolol 25 mg BID, and furosemide 20 mg daily.     used

## 2023-06-26 NOTE — H&P ADULT - HISTORY OF PRESENT ILLNESS
43 years old male with past medical history of Hypertension, Hyperlipidemia, Tobacco use disorder, Depression, who is admitted to in patient psychiatry unit for stabilization of his medications and mood. Patient is seen for medical history and physical.     He denies any chest pain or pressure.   No nausea or vomiting.   No fever or chills. No

## 2024-08-16 NOTE — PROGRESS NOTE BEHAVIORAL HEALTH - NSBHCHARTREVIEWINVESTIGATE_PSY_A_CORE FT
Initiate Treatment: mupirocin 2 % topical ointment \\nQuantity: 22.0 g  Days Supply: 7\\nSig: Apply to wound 2 times daily x 5-7 days or until healed\\n\\ndoxycycline hyclate 100 mg capsule \\nQuantity: 20.0 Capsule  Days Supply: 10\\nSig: Take one capsule PO BID x 10 days. Take with food and water. Detail Level: Zone Render In Strict Bullet Format?: No See [place]